# Patient Record
Sex: FEMALE | Race: OTHER | NOT HISPANIC OR LATINO | Employment: FULL TIME | URBAN - METROPOLITAN AREA
[De-identification: names, ages, dates, MRNs, and addresses within clinical notes are randomized per-mention and may not be internally consistent; named-entity substitution may affect disease eponyms.]

---

## 2024-08-08 DIAGNOSIS — M25.562 CHRONIC PAIN OF LEFT KNEE: ICD-10-CM

## 2024-08-08 DIAGNOSIS — G89.29 CHRONIC PAIN OF LEFT KNEE: ICD-10-CM

## 2024-08-08 RX ORDER — MELOXICAM 15 MG/1
15 TABLET ORAL DAILY
Qty: 30 TABLET | Refills: 1 | Status: SHIPPED | OUTPATIENT
Start: 2024-08-08

## 2024-08-11 DIAGNOSIS — L70.0 ACNE VULGARIS: ICD-10-CM

## 2024-08-12 RX ORDER — TRETINOIN 0.25 MG/G
CREAM TOPICAL NIGHTLY
Qty: 20 G | Refills: 4 | Status: SHIPPED | OUTPATIENT
Start: 2024-08-12

## 2024-09-08 DIAGNOSIS — A60.9 ANOGENITAL HERPES SIMPLEX VIRUS (HSV) INFECTION: ICD-10-CM

## 2024-09-08 RX ORDER — VALACYCLOVIR HYDROCHLORIDE 500 MG/1
500 TABLET, FILM COATED ORAL
Qty: 90 TABLET | Refills: 1 | Status: SHIPPED | OUTPATIENT
Start: 2024-09-08

## 2024-09-08 NOTE — TELEPHONE ENCOUNTER
No care due was identified.  [facility] Embedded Care Due Messages. Reference number: [id number].   9/08/2024 1:07:21 AM CDT

## 2024-09-08 NOTE — TELEPHONE ENCOUNTER
Refill Decision Note   Sherice Patrick  is requesting a refill authorization.  Brief Assessment and Rationale for Refill:  Approve     Medication Therapy Plan:        Comments:     Note composed:5:05 PM 09/08/2024

## 2024-09-18 ENCOUNTER — PATIENT MESSAGE (OUTPATIENT)
Dept: DERMATOLOGY | Facility: CLINIC | Age: 54
End: 2024-09-18
Payer: COMMERCIAL

## 2024-10-14 ENCOUNTER — PATIENT MESSAGE (OUTPATIENT)
Dept: FAMILY MEDICINE | Facility: CLINIC | Age: 54
End: 2024-10-14
Payer: COMMERCIAL

## 2024-10-14 ENCOUNTER — PATIENT MESSAGE (OUTPATIENT)
Dept: ORTHOPEDICS | Facility: CLINIC | Age: 54
End: 2024-10-14
Payer: COMMERCIAL

## 2024-10-14 DIAGNOSIS — M25.562 CHRONIC PAIN OF LEFT KNEE: ICD-10-CM

## 2024-10-14 DIAGNOSIS — G89.29 CHRONIC PAIN OF LEFT KNEE: ICD-10-CM

## 2024-10-15 RX ORDER — MELOXICAM 15 MG/1
15 TABLET ORAL DAILY
Qty: 30 TABLET | Refills: 1 | OUTPATIENT
Start: 2024-10-15

## 2024-10-16 ENCOUNTER — OFFICE VISIT (OUTPATIENT)
Dept: FAMILY MEDICINE | Facility: CLINIC | Age: 54
End: 2024-10-16
Payer: COMMERCIAL

## 2024-10-16 DIAGNOSIS — R03.0 ELEVATED BP WITHOUT DIAGNOSIS OF HYPERTENSION: ICD-10-CM

## 2024-10-16 DIAGNOSIS — G89.29 CHRONIC PAIN OF LEFT KNEE: Primary | ICD-10-CM

## 2024-10-16 DIAGNOSIS — Z79.1 LONG TERM (CURRENT) USE OF NON-STEROIDAL ANTI-INFLAMMATORIES (NSAID): ICD-10-CM

## 2024-10-16 DIAGNOSIS — M25.562 CHRONIC PAIN OF LEFT KNEE: Primary | ICD-10-CM

## 2024-10-16 RX ORDER — OMEPRAZOLE 20 MG/1
20 CAPSULE, DELAYED RELEASE ORAL DAILY
Qty: 90 CAPSULE | Refills: 3 | Status: SHIPPED | OUTPATIENT
Start: 2024-10-16 | End: 2025-10-16

## 2024-10-16 RX ORDER — MELOXICAM 15 MG/1
15 TABLET ORAL DAILY
Qty: 90 TABLET | Refills: 1 | Status: SHIPPED | OUTPATIENT
Start: 2024-10-16 | End: 2025-04-14

## 2024-10-16 NOTE — PROGRESS NOTES
The patient location is:  Patient Home [place]  The chief complaint leading to consultation is:   Chief Complaint   Patient presents with    Knee Pain     Of the left knee - med refill     Total time spent with patient: 10 min     Visit type: Virtual visit with synchronous audio and video  Each patient to whom he or she provides medical services by telemedicine is:  (1) informed of the relationship between the physician and patient and the respective role of any other health care provider with respect to management of the patient; and (2) notified that he or she may decline to receive medical services by telemedicine and may withdraw from such care at any time.    This service was not originating from a related E/M service provided within the previous 7 days nor will  to an E/M service or procedure within the next 24 hours or my soonest available appointment.  Prevailing standard of care was able to be met in this synchronous audio and video visit    Subjective:    Chief Complaint:   Chief Complaint   Patient presents with    Knee Pain     Of the left knee - med refill     274}  HPI:  54 y.o. female presents for telehealth evaluation.  She continues to experience left knee pain she has been seen by ortho with prescription for meloxicam however medication has not yet been renewed.  She does feel that meloxicam helps with improvement of pain and swelling of the left knee.    She has not yet had aspiration of the knee but has reported a good improvement of knee swelling and pain with steroid injection (performed December 2023).     The HPI and pertinent ROS is included in the Diagnostic Impression Remarks section at the end of the note. Please see below for further details.     The following portions of the patient's history were reviewed and updated as appropriate: allergies, current medications, past family history, past medical history, past social history, past surgical history and problem list.  Past  Medical History:   Diagnosis Date    Abnormal Pap smear of cervix     Anogenital herpesviral infection, unspecified     Daily suppressive therapy.  No recent outbreaks    Basal cell carcinoma     Colon polyp      Past Surgical History:   Procedure Laterality Date    AUGMENTATION OF BREAST Bilateral 2005    BREAST BIOPSY Right     Benign x3    CERVICAL BIOPSY  W/ LOOP ELECTRODE EXCISION      COLONOSCOPY      3 years ago in [place]    COLONOSCOPY N/A 2/19/2024    Procedure: COLONOSCOPY;  Surgeon: Gregorio Mckeon MD;  Location: [facility];  Service: Endoscopy;  Laterality: N/A;    CYSTOSCOPY N/A 09/18/2023    Procedure: CYSTOSCOPY;  Surgeon: Mary Kate Felipe MD;  [facility];  Service: Urology;  Laterality: N/A;    CYSTOSCOPY N/A 10/23/2023    Procedure: CYSTOSCOPY;  Surgeon: Mary Kate Felipe MD;  Location: [facility] OR;  Service: Urology;  Laterality: N/A;    ESOPHAGOGASTRODUODENOSCOPY N/A 2/5/2024    Procedure: EGD (ESOPHAGOGASTRODUODENOSCOPY);  Surgeon: Gregorio Mckeon MD;  Location: [facility];  Service: Endoscopy;  Laterality: N/A;    KNEE SURGERY Left     LUMBAR FUSION  2010    OVARIAN CYST REMOVAL      Laparoscopy    URETHRAL SLING  03/2022    TVT     Social History  Social History     Tobacco Use    Smoking status: Never     Passive exposure: Never    Smokeless tobacco: Never   Substance Use Topics    Alcohol use: Never    Drug use: Never     Family History   Problem Relation Name Age of Onset    Ovarian cancer Mother      Colon cancer Neg Hx      Crohn's disease Neg Hx      Ulcerative colitis Neg Hx       Review of patient's allergies indicates:  No Known Allergies  274}  Physical Examination  There were no vitals taken for this visit.  Wt Readings from Last 3 Encounters:   06/07/24 62.6 kg (138 lb 0.1 oz)   04/23/24 62.6 kg (138 lb 0.1 oz)   03/15/24 66.2 kg (145 lb 15.1 oz)     BP Readings from Last 3 Encounters:   04/23/24 (!) 146/64   02/19/24 (!) 100/59   02/05/24 123/76  "    Estimated body mass index is 20.38 kg/m² as calculated from the following:    Height as of 6/7/24: 5' 9" (1.753 m).    Weight as of 6/7/24: 62.6 kg (138 lb 0.1 oz).       CONSTITUTIONAL: No apparent distress. Does not appear acutely ill or septic. Appears adequately hydrated.  PULM: Breathing unlabored.  PSYCHIATRIC: Alert and conversant and grossly oriented. Mood is grossly neutral. Affect appropriate. Judgment and insight grossly intact.  Integument: normal coloration and turgor, no rashes, no suspicious skin lesions noted.    Data reviewed  Previous medical records reviewed and summarized in HPI.   274}    Laboratory  I have reviewed old labs below:  Lab Results   Component Value Date    WBC 7.16 01/29/2024    HGB 13.1 01/29/2024    HCT 40.0 01/29/2024    MCV 97 01/29/2024     01/29/2024     01/29/2024    K 4.0 01/29/2024     01/29/2024    CALCIUM 9.6 01/29/2024    CO2 27 01/29/2024    GLU 92 01/29/2024    BUN 14 01/29/2024    CREATININE 0.8 01/29/2024    ANIONGAP 7 (L) 01/29/2024    PROT 7.4 01/29/2024    ALBUMIN 3.8 01/29/2024    BILITOT 1.2 (H) 01/29/2024    ALKPHOS 47 (L) 01/29/2024    ALT 13 01/29/2024    AST 15 01/29/2024    CHOL 167 01/29/2024    TRIG 61 01/29/2024    HDL 70 01/29/2024    LDLCALC 84.8 01/29/2024    TSH 0.930 01/29/2024    HGBA1C 5.2 01/29/2024     Lab reviewed by me: Particular labs of significance that I will monitor, workup, or treat to improve are mentioned below in diagnostic impression remarks.  274}  Imaging/EKG: I have reviewed the pertinent results/findings and my personal findings are noted below in diagnostic impression remarks.     Assessment/Plan  Sherice Patrick is a 54 y.o. female who presents to clinic with:    1. Chronic pain of left knee    2. Long term (current) use of non-steroidal anti-inflammatories (nsaid)    3. Elevated BP without diagnosis of hypertension         Diagnostic Impression Remarks + HPI     Documentation entered by me for this " "encounter may have been done in part using speech-recognition technology. Although I have made an effort to ensure accuracy, "sound like" errors may exist and should be interpreted in context.           This is the extent of the patient's complaints at this present time. She denies chest pain upon exertion, dyspnea, nausea, vomiting, diaphoresis, and syncope. No pleuritic chest pain, unilateral leg swelling, calf tenderness, or calf pain.     Sherice will return to clinic in a few months for further workup and reassessment or sooner as needed. She was instructed to call the clinic or go to the emergency department if her symptoms do not improve, worsens, or if new symptoms develop. As we discussed that symptoms could worsen over the next 24 hours she was advised that if any increased swelling, pain, or numbness arise to go immediately to the ED. Patient knows to call any time if an emergency arises. Shared decision making occurred and she verbalized understanding in agreement with this plan.   274}  BMI Goal    Counseled patient on her ideal body weight, health consequences of being obese and current recommendations including weekly exercise and a heart healthy diet.  She is aware that ideal BMI < 25  Estimated body mass index is 20.38 kg/m² as calculated from the following:    Height as of 6/7/24: 5' 9" (1.753 m).    Weight as of 6/7/24: 62.6 kg (138 lb 0.1 oz).     She was counseled about the importance of healthy dietary habits as well as routine physical activity and exercise for better health outcomes. I also discussed the importance of cancer screening.     Medication Monitoring    In today's visit, monitoring for drug toxicity was accomplished. Proper use of medications was also discussed.     Counseling    Eat Less Unhealthy Fat   -Cut back on saturated fats and trans (also called hydrogenated) fats. A diet thats high in these fats increases your bad cholesterol. Its not enough to just cut back on foods " containing cholesterol.   -Eat about 2 servings of fish per week. Most fish contain omega-3 fatty acids. These help lower blood cholesterol.   -Eat more whole grains and soluble fiber (such as oat bran). These lower overall cholesterol.   -Counseled that most cholesterol is made in the liver and only a minority comes from diet.    Be Active   -Choose an activity you enjoy. Walking, swimming, and riding a bike are some good ways to be active.   -Start at a level where you feel comfortable. Increase your time and pace a little each week.   -Work up to 30 minutes on most days. You can break this up into three 10-minute periods.   -Remember, some activity is better than none.   -If you havent been exercising regularly, start slowly. Check with your doctor to make sure the exercise plan is right for you.     Take Medication As Directed: Many patients need medication to get their LDL levels to a safe level. Medication to lower cholesterol levels is effective and safe. (But taking medication is not a substitute for exercise or watching your diet!).    I discussed imaging findings, diagnosis, possibilities, treatment options, medications, risks, and benefits. She had many questions regarding the options and long-term effects. All questions were answered. She expressed understanding after counseling regarding the diagnosis and recommendations. She was capable and demonstrated competence with understanding of these options. Shared decision making was performed resulting in her choosing the current treatment plan.     I also discussed the importance of close follow up to discuss labs, change or modify her medications if needed, monitor side effects, and further evaluation of medical problems.     Additional workup planned: see labs ordered below.    See below for labs and meds ordered with associated diagnosis    Chronic pain of left knee  Comments:  Refilled meloxicam.  Per ortho.  Recommended follow up with ortho regarding  next steps if knee pain and swelling remains chronic  Orders:  -     meloxicam (MOBIC) 15 MG tablet; Take 1 tablet (15 mg total) by mouth once daily. For Knee Pain  Dispense: 90 tablet; Refill: 1    Long term (current) use of non-steroidal anti-inflammatories (nsaid)  Comments:  Start omeprazole therapy  Orders:  -     meloxicam (MOBIC) 15 MG tablet; Take 1 tablet (15 mg total) by mouth once daily. For Knee Pain  Dispense: 90 tablet; Refill: 1  -     omeprazole (PRILOSEC) 20 MG capsule; Take 1 capsule (20 mg total) by mouth once daily. Take in the AM before meals  Dispense: 90 capsule; Refill: 3    Elevated BP without diagnosis of hypertension  Comments:  Previously elevated BP at 146/64.  On NSAID therapy secondary to knee pain.  Recommended monitoring of BP and alternating meloxicam with Tylenol          Medication List with Changes/Refills   New Medications    OMEPRAZOLE (PRILOSEC) 20 MG CAPSULE    Take 1 capsule (20 mg total) by mouth once daily. Take in the AM before meals   Current Medications    ALBUTEROL (VENTOLIN HFA) 90 MCG/ACTUATION INHALER    Inhale 2 puffs into the lungs every 6 (six) hours as needed for Wheezing. Rescue    BIOTIN 100 MG/GRAM POWD    Take by mouth.    LACTOBACILLUS ACIDOPHILUS (PROBIOTIC) 10 BILLION CELL CAP    Take by mouth.    MULTIVITAMIN-IRON-FOLIC ACID TAB    Take by mouth.    TRETINOIN (RETIN-A) 0.025 % CREAM    Apply topically every evening.    VALACYCLOVIR (VALTREX) 500 MG TABLET    TAKE 1 TABLET BY MOUTH EVERY DAY   Changed and/or Refilled Medications    Modified Medication Previous Medication    MELOXICAM (MOBIC) 15 MG TABLET meloxicam (MOBIC) 15 MG tablet       Take 1 tablet (15 mg total) by mouth once daily. For Knee Pain    Take 1 tablet (15 mg total) by mouth once daily.   Discontinued Medications    PANTOPRAZOLE (PROTONIX) 40 MG TABLET    TAKE 1 TABLET BY MOUTH EVERY DAY     Modified Medications    Modified Medication Previous Medication    MELOXICAM (MOBIC) 15 MG TABLET  "meloxicam (MOBIC) 15 MG tablet       Take 1 tablet (15 mg total) by mouth once daily. For Knee Pain    Take 1 tablet (15 mg total) by mouth once daily.       Mayra Gardner DO  10/16/2024     Documentation entered by me for this encounter may have been done in part using speech-recognition technology. Although I have made an effort to ensure accuracy, "sound like" errors may exist and should be interpreted in context.  "

## 2024-11-06 ENCOUNTER — PATIENT MESSAGE (OUTPATIENT)
Dept: DERMATOLOGY | Facility: CLINIC | Age: 54
End: 2024-11-06

## 2024-11-06 ENCOUNTER — OFFICE VISIT (OUTPATIENT)
Dept: DERMATOLOGY | Facility: CLINIC | Age: 54
End: 2024-11-06
Payer: COMMERCIAL

## 2024-11-06 DIAGNOSIS — L57.8 PHOTOAGING OF SKIN: ICD-10-CM

## 2024-11-06 DIAGNOSIS — L72.9 CYST OF SKIN: Primary | ICD-10-CM

## 2024-11-06 DIAGNOSIS — I78.1 TELANGIECTASIAS: ICD-10-CM

## 2024-11-06 DIAGNOSIS — D48.5 NEOPLASM OF UNCERTAIN BEHAVIOR OF SKIN: ICD-10-CM

## 2024-11-06 PROCEDURE — 11103 TANGNTL BX SKIN EA SEP/ADDL: CPT | Mod: S$GLB,,, | Performed by: STUDENT IN AN ORGANIZED HEALTH CARE EDUCATION/TRAINING PROGRAM

## 2024-11-06 PROCEDURE — 11104 PUNCH BX SKIN SINGLE LESION: CPT | Mod: S$GLB,,, | Performed by: STUDENT IN AN ORGANIZED HEALTH CARE EDUCATION/TRAINING PROGRAM

## 2024-11-06 PROCEDURE — 88305 TISSUE EXAM BY PATHOLOGIST: CPT | Performed by: PATHOLOGY

## 2024-11-06 PROCEDURE — 88304 TISSUE EXAM BY PATHOLOGIST: CPT | Performed by: PATHOLOGY

## 2024-11-06 PROCEDURE — 99213 OFFICE O/P EST LOW 20 MIN: CPT | Mod: 25,S$GLB,, | Performed by: STUDENT IN AN ORGANIZED HEALTH CARE EDUCATION/TRAINING PROGRAM

## 2024-11-06 NOTE — PROGRESS NOTES
Subjective:      Patient ID:  Sherice Patrick is a 54 y.o. female who presents for   Chief Complaint   Patient presents with    Spot     Right upper cheek      LOV 2/29/24    Patient here today for spot on right upper cheek x approx 2 months. Will be red after washing face and bleeds at times.     Also complains of spot on back. Not bothersome.    Path from last visit: 2/29/24  Final Pathologic Diagnosis   1.  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  - MARGINS ARE NEGATIVE IN THE PLANES OF SECTION.    Derm hx:  BCC right temple-Mohs- 2023-in [place]  BCC  mid chest- 2020  No fhx of MM     Hx of tanning use.               Review of Systems   Constitutional:  Negative for fever, chills and fatigue.   Skin:  Positive for daily sunscreen use, activity-related sunscreen use and wears hat. Negative for itching, rash, dry skin and sun sensitivity.   Hematologic/Lymphatic: Does not bruise/bleed easily.       Objective:   Physical Exam   Constitutional: She appears well-developed and well-nourished.   Neurological: She is alert and oriented to person, place, and time.   Psychiatric: She has a normal mood and affect.   Skin:   Areas Examined (abnormalities noted in diagram):   Head / Face Inspection Performed  Back Inspection Performed                 Diagram Legend     Erythematous scaling macule/papule c/w actinic keratosis       Vascular papule c/w angioma      Pigmented verrucoid papule/plaque c/w seborrheic keratosis      Yellow umbilicated papule c/w sebaceous hyperplasia      Irregularly shaped tan macule c/w lentigo     1-2 mm smooth white papules consistent with Milia      Movable subcutaneous cyst with punctum c/w epidermal inclusion cyst      Subcutaneous movable cyst c/w pilar cyst      Firm pink to brown papule c/w dermatofibroma      Pedunculated fleshy papule(s) c/w skin tag(s)      Evenly  pigmented macule c/w junctional nevus     Mildly variegated pigmented, slightly irregular-bordered macule c/w mildly atypical nevus      Flesh colored to evenly pigmented papule c/w intradermal nevus       Pink pearly papule/plaque c/w basal cell carcinoma      Erythematous hyperkeratotic cursted plaque c/w SCC      Surgical scar with no sign of skin cancer recurrence      Open and closed comedones      Inflammatory papules and pustules      Verrucoid papule consistent consistent with wart     Erythematous eczematous patches and plaques     Dystrophic onycholytic nail with subungual debris c/w onychomycosis     Umbilicated papule    Erythematous-base heme-crusted tan verrucoid plaque consistent with inflamed seborrheic keratosis     Erythematous Silvery Scaling Plaque c/w Psoriasis     See annotation            Assessment / Plan:      Pathology Orders:       Normal Orders This Visit    Specimen to Pathology, Dermatology     Questions:    Procedure Type: Dermatology and skin neoplasms    Number of Specimens: 2    ------------------------: -------------------------    Spec 1 Procedure: Biopsy    Spec 1 Clinical Impression: r/o bcc    Spec 1 Source: right malar cheek    ------------------------: -------------------------    Spec 2 Procedure: Biopsy    Spec 2 Clinical Impression: cyst vs other    Spec 2 Source: right upper back    Release to patient:           Cyst of skin  -     Specimen to Pathology, Dermatology  Punch biopsy procedure note:  Punch biopsy performed after verbal consent obtained. Area marked and prepped with alcohol. Approximately 1cc of 1% lidocaine with epinephrine injected. 6 mm disposable punch used to remove lesion. Hemostasis obtained and biopsy site closed with 1 - 2 Prolene sutures. Wound care instructions reviewed with patient and handout given.    Neoplasm of uncertain behavior of skin  -     Specimen to Pathology, Dermatology  Shave biopsy procedure note:    Shave biopsy performed after  verbal consent including risk of infection, scar, recurrence, need for additional treatment of site. Area prepped with alcohol, anesthetized with approximately 1.0cc of 1% lidocaine with epinephrine. Lesional tissue shaved with razor blade. Hemostasis achieved with application of aluminum chloride followed by hyfrecation. No complications. Dressing applied. Wound care explained.    Telangiectasias   Photoaging of skin  Discussed compounded medications to help with vasoconstriction like oxymetazoline or brimonidine vs. Laser treatments for more permanent improvement  Refer to [facility]         No follow-ups on file.

## 2024-11-08 LAB
FINAL PATHOLOGIC DIAGNOSIS: NORMAL
GROSS: NORMAL
Lab: NORMAL
MICROSCOPIC EXAM: NORMAL

## 2024-11-11 ENCOUNTER — TELEPHONE (OUTPATIENT)
Dept: DERMATOLOGY | Facility: CLINIC | Age: 54
End: 2024-11-11
Payer: COMMERCIAL

## 2024-11-11 DIAGNOSIS — C44.319 BASAL CELL CARCINOMA (BCC) OF RIGHT CHEEK: Primary | ICD-10-CM

## 2024-11-11 NOTE — TELEPHONE ENCOUNTER
Message forward to correct clinic    ----- Message from [place]a sent at 11/11/2024  4:23 PM CST -----  Regarding: call back  Contact: pt  Type: Needs Medical Advice  Who Called:  patient  Symptoms (please be specific):    How long has patient had these symptoms:    Pharmacy name and phone #:    Best Call Back Number: [phone]    Additional Information: loco farnsworth pt is returning ur call are u available to assist MRN: [id number] JAY FOOTE

## 2024-11-14 ENCOUNTER — PROCEDURE VISIT (OUTPATIENT)
Dept: DERMATOLOGY | Facility: CLINIC | Age: 54
End: 2024-11-14
Payer: COMMERCIAL

## 2024-11-14 VITALS
DIASTOLIC BLOOD PRESSURE: 75 MMHG | HEART RATE: 51 BPM | HEIGHT: 69 IN | BODY MASS INDEX: 20.44 KG/M2 | SYSTOLIC BLOOD PRESSURE: 122 MMHG | WEIGHT: 138 LBS

## 2024-11-14 DIAGNOSIS — C44.319 BASAL CELL CARCINOMA (BCC) OF RIGHT CHEEK: ICD-10-CM

## 2024-11-14 PROCEDURE — 13132 CMPLX RPR F/C/C/M/N/AX/G/H/F: CPT | Mod: S$GLB,,, | Performed by: DERMATOLOGY

## 2024-11-14 PROCEDURE — 17311 MOHS 1 STAGE H/N/HF/G: CPT | Mod: 51,S$GLB,, | Performed by: DERMATOLOGY

## 2024-11-14 NOTE — PROGRESS NOTES
MOHS MICROGRAPHIC SURGERY OPERATIVE NOTE  Name:  Sherice Patrick  Date: 2024  Patient : 1970  Attending Surgeon: Morro Arredondo MD  Assistants: Lizz Mcmillan - Surgical Technician  Anesthetic Agent: 1% lidocaine with 1:100,000 epinephrine  Clinical Diagnosis: basal cell carcinoma   Operation: Mohs Micrographic Surgery  Location: right malar cheek  Indications: Location in mask areas of face including central face, nose, eyelids, eyebrows, lips, chin, preauricular, temple, and ear.  Surgical Preparation: povidone-iodine  Pre-op anbitioics: no     Description of Operation:  The nature and purpose of the procedure, associated risks and alternative treatments were explained to the patient in detail. All patient questions were answered completely. An informed operative consent and photography permit were obtained. The tumor location was then identified and marked with agreement by the patient of the correct location. The patient was positioned, prepped, and draped in the usual sterile manner. Local anesthesia was obtained with 2 cc(s) of 1% lidocaine with 1:100,000 epinephrine. The lesion pre-operatively measures 0.6 by 0.4 cm.     1ST STAGE:  A 2+ mm rim of normal appearing skin was marked circumferentially around the lesion after scraping with a curette to define the margin. The area thus outlined was excised at a 45 degree angle. Hemostasis was obtained with electrodesiccation. The specimen was oriented, mapped, and subdivided into at least two sections. Sections were then chromacoded and submitted for horizontal frozen sections. The patient tolerated the procedure well and there were no complications. Upon microscopic examination of the processed horizontal frozen sections of this stage:  No residual tumor was noted.      SUMMARY:  The tumor was extirpated in 1 Mohs Stages resulting in a final defect measuring 1.1 by 0.6 cm².   The final defect extended deep to subcutaneous fat  The patient  tolerated the procedure well and no complications were noted.     DEFECT PHOTO:      Morro Arerdondo MD  Complex Linear Closure Operative Note  Name: Sherice Patrick  YOB: 1970  Date: 11/14/2024  Attending Surgeon: Morro Arredondo MD FAAD  Assistants:  Lizz Mcmillan - Surgical Technician  Clinical Diagnosis: A 1.1 by 0.6 cm defect secondary to Mohs Micrographic Surgery  Location: right malar cheek  Operation: Complex linear closure  Surgical Preparation: broad scrub with povidone-iodine    Description of Operation:  The nature and purpose of the procedure, associated risks and alternative treatments were explained to the patient in detail. All patient questions were answered completely. In order to minimize tension on the closure and avoid compromising the anatomic contour of the cosmetic region and maximize functional capacity, a complex linear closure was performed. An informed operative consent and photography permit were obtained. The patient was positioned, prepped, and draped in the usual sterile manner. Local anesthesia was obtained with 3.0 cc(s) of 1% lidocaine with 1:100,000 epinephrine.    The defect edges were debeveled with a scalpel blade. The circular defect was widely undermined in the deep subcutaneous plane at least 100% of the defect diameter to allow for maximum tissue movement. Hemostasis was achieved with spot electrodessication. The defect was closed first utilizing multiple 5-0 Monocryl interrupted buried subcutaneous sutures. This resulted in excellent apposition of the dermis and epidermis, however two redundant areas of tissue were created on opposite sides of the defect. Utilizing a #15 scalpel blade, two Burows triangles were excised to ensure a flat scar. Hemostasis was obtained with spot electrodessication. The skin edges throughout further fastened superficially with 6-0 Prolene. The final length of the repair was 3.2 cm. The patient tolerated the procedure  well and there were no complications.    Polysporin, non-adherent gauze and a pressure dressing were applied to wound after gentle cleansing with saline.    Patient instructed in and provided with instructions for post-op care, will RTC in 7 days for suture removal    Post op meds: None    Photos:  Take photo @      MD MANUEL Glynn

## 2024-11-21 ENCOUNTER — CLINICAL SUPPORT (OUTPATIENT)
Dept: DERMATOLOGY | Facility: CLINIC | Age: 54
End: 2024-11-21
Payer: COMMERCIAL

## 2024-11-21 DIAGNOSIS — Z48.02 VISIT FOR SUTURE REMOVAL: Primary | ICD-10-CM

## 2024-11-21 PROCEDURE — 99999 PR PBB SHADOW E&M-EST. PATIENT-LVL I: CPT | Mod: PBBFAC,,,

## 2024-11-21 NOTE — PROGRESS NOTES
Mohs Surgery Suture Removal Note   Patient Name: Sherice Patrick  Patient : 1970  Date of Service: 2024    CC: Pt. Presents for removal of sutures on the right cheek today  Subjective: patient reports wound healing as expected     Objective: Wound well healed, sutures clean/dry/intact. No evidence of any complications noted.     Visit For Suture Removal:  - Suture removal today  - Steri strips/mastisol were not applied  - Patient counseled on silicone gel/silicone sheeting and their use in the management of post-operative scars  - Handout on silicone gel/silicone gel sheeting was provided  - Patient to follow up with their referring provider in 3 months for further skin evaluation        Skylar Shea

## 2024-12-10 DIAGNOSIS — L70.0 ACNE VULGARIS: ICD-10-CM

## 2024-12-11 RX ORDER — TRETINOIN 0.25 MG/G
CREAM TOPICAL NIGHTLY
Qty: 20 G | Refills: 4 | Status: SHIPPED | OUTPATIENT
Start: 2024-12-11

## 2024-12-18 ENCOUNTER — PATIENT MESSAGE (OUTPATIENT)
Dept: DERMATOLOGY | Facility: CLINIC | Age: 54
End: 2024-12-18
Payer: COMMERCIAL

## 2024-12-19 ENCOUNTER — PATIENT MESSAGE (OUTPATIENT)
Dept: DERMATOLOGY | Facility: CLINIC | Age: 54
End: 2024-12-19
Payer: COMMERCIAL

## 2025-01-07 ENCOUNTER — PATIENT MESSAGE (OUTPATIENT)
Dept: OBSTETRICS AND GYNECOLOGY | Facility: CLINIC | Age: 55
End: 2025-01-07
Payer: COMMERCIAL

## 2025-02-21 ENCOUNTER — OFFICE VISIT (OUTPATIENT)
Dept: OBSTETRICS AND GYNECOLOGY | Facility: CLINIC | Age: 55
End: 2025-02-21
Payer: COMMERCIAL

## 2025-02-21 VITALS
SYSTOLIC BLOOD PRESSURE: 118 MMHG | BODY MASS INDEX: 22.89 KG/M2 | DIASTOLIC BLOOD PRESSURE: 64 MMHG | HEIGHT: 69 IN | WEIGHT: 154.56 LBS

## 2025-02-21 DIAGNOSIS — Z12.4 SCREENING FOR MALIGNANT NEOPLASM OF CERVIX: ICD-10-CM

## 2025-02-21 DIAGNOSIS — H93.19 TINNITUS, UNSPECIFIED LATERALITY: ICD-10-CM

## 2025-02-21 DIAGNOSIS — Z12.31 ENCOUNTER FOR SCREENING MAMMOGRAM FOR MALIGNANT NEOPLASM OF BREAST: ICD-10-CM

## 2025-02-21 DIAGNOSIS — R14.0 ABDOMINAL BLOATING: ICD-10-CM

## 2025-02-21 DIAGNOSIS — Z01.419 WELL WOMAN EXAM WITH ROUTINE GYNECOLOGICAL EXAM: Primary | ICD-10-CM

## 2025-02-21 DIAGNOSIS — D25.9 UTERINE LEIOMYOMA, UNSPECIFIED LOCATION: ICD-10-CM

## 2025-02-21 PROCEDURE — 3008F BODY MASS INDEX DOCD: CPT | Mod: CPTII,S$GLB,, | Performed by: OBSTETRICS & GYNECOLOGY

## 2025-02-21 PROCEDURE — 87624 HPV HI-RISK TYP POOLED RSLT: CPT | Performed by: OBSTETRICS & GYNECOLOGY

## 2025-02-21 PROCEDURE — 99396 PREV VISIT EST AGE 40-64: CPT | Mod: S$GLB,,, | Performed by: OBSTETRICS & GYNECOLOGY

## 2025-02-21 PROCEDURE — 3078F DIAST BP <80 MM HG: CPT | Mod: CPTII,S$GLB,, | Performed by: OBSTETRICS & GYNECOLOGY

## 2025-02-21 PROCEDURE — 1159F MED LIST DOCD IN RCRD: CPT | Mod: CPTII,S$GLB,, | Performed by: OBSTETRICS & GYNECOLOGY

## 2025-02-21 PROCEDURE — 99999 PR PBB SHADOW E&M-EST. PATIENT-LVL III: CPT | Mod: PBBFAC,,, | Performed by: OBSTETRICS & GYNECOLOGY

## 2025-02-21 PROCEDURE — 3074F SYST BP LT 130 MM HG: CPT | Mod: CPTII,S$GLB,, | Performed by: OBSTETRICS & GYNECOLOGY

## 2025-02-21 NOTE — PROGRESS NOTES
[facility] Note    Pertinent Med & GYN Problem List    Know Uterine myoma    SUBJECTIVE     Chief Complaint   Patient presents with    Annual Exam       History and Physical:  Patient's last menstrual period was 2023.    HRT: None    Date: 2025    Sherice Patrick is a 55 y.o.  who presents today for her routine annual GYN exam.     Gynecologic issues: The patient denies any pelvic pain but reports recent issues with abdominal fullness and bloating.  She does report increasing hot flashes as well as issues with sleeping.    From a nongynecologic standpoint the patient does report some recent issues with tinnitus.    Pap smear history:  Positive Hx Abnormal pap smear.  (LEEP).  Last Pap smear: 2023    Mammogram: Up-to-date 2024  Colon cancer screening:  Up-to-date 2024 q 5 years    Family history:  Breast cancer:  Negative  Colon cancer:  Negative  Gyn related cancer:  POSITIVE - Mother @ age 75    The patient reports no changes in her medical or surgical history since her last evaluation.     Allergies: Review of patient's allergies indicates:  No Known Allergies    Past Medical History:   Diagnosis Date    Abnormal Pap smear of cervix     Anogenital herpesviral infection, unspecified     Daily suppressive therapy.  No recent outbreaks    Basal cell carcinoma     Colon polyp        Past Surgical History:   Procedure Laterality Date    AUGMENTATION OF BREAST Bilateral     BREAST BIOPSY Right     Benign x3    CERVICAL BIOPSY  W/ LOOP ELECTRODE EXCISION      COLONOSCOPY      3 years ago in [place]    COLONOSCOPY N/A 2024    Procedure: COLONOSCOPY;  Surgeon: Gregorio Mckeon MD;  Location: [facility] ENDO;  Service: Endoscopy;  Laterality: N/A;    CYSTOSCOPY N/A 2023    Procedure: CYSTOSCOPY;  Surgeon: Mary Kate Felipe MD;  Location: [facility] OR;  Service: Urology;  Laterality: N/A;    CYSTOSCOPY N/A 10/23/2023    Procedure: CYSTOSCOPY;  Surgeon:  "Mary Kate Felipe MD;  Location: [facility] OR;  Service: Urology;  Laterality: N/A;    ESOPHAGOGASTRODUODENOSCOPY N/A 2024    Procedure: EGD (ESOPHAGOGASTRODUODENOSCOPY);  Surgeon: Gregorio Mckeon MD;  Location: [facility];  Service: Endoscopy;  Laterality: N/A;    KNEE SURGERY Left     LUMBAR FUSION      OVARIAN CYST REMOVAL      Laparoscopy    URETHRAL SLING  2022    TVT       MEDS: Current Medications[1]     OB History          2    Para   2    Term   2            AB        Living   2         SAB        IAB        Ectopic        Multiple        Live Births   2           Obstetric Comments   Vaginal delivery x 2               Age of Menarche:12  Age at first pregnancy:30   Age at first live birth:31  Number of months breastfeeding:    Age at Menopause:    Comments:       Social History[2]    Family History   Problem Relation Name Age of Onset    Ovarian cancer Mother      Colon cancer Neg Hx      Crohn's disease Neg Hx      Ulcerative colitis Neg Hx         Past medical and surgical history reviewed.   I have reviewed the patient's medical history in detail and updated the computerized patient record.    Review of Systems (at today's evaluation)  Review of Systems   Constitutional:  Negative for fever and unexpected weight change.   HENT:  Positive for tinnitus.    Respiratory:  Negative for cough and shortness of breath.    Cardiovascular:  Negative for chest pain.   Gastrointestinal:  Positive for abdominal distention (bloating). Negative for constipation, diarrhea and nausea.   Genitourinary:  Negative for dysuria and frequency.          GYN AS PER HPI   Musculoskeletal: Negative.    Skin: Negative.    Neurological: Negative.    Psychiatric/Behavioral:  Positive for sleep disturbance.         Physical Exam:   /64 (BP Location: Left arm, Patient Position: Sitting)   Ht 5' 9" (1.753 m)   Wt 70.1 kg (154 lb 8.7 oz)   LMP 2023   BMI 22.82 kg/m²     Physical Exam: "   Constitutional: She appears well-developed and well-nourished.    HENT:   Head: Normocephalic.     Neck: No thyroid mass present.    Cardiovascular:  Normal rate.             Pulmonary/Chest: Effort normal. Right breast exhibits augmentation. Right breast exhibits no mass, no nipple discharge and no skin change. Left breast exhibits augmentation. Left breast exhibits no mass, no nipple discharge and no skin change.        Abdominal: Soft. There is no abdominal tenderness.     Genitourinary:    Inguinal canal, vagina, right adnexa and left adnexa normal.      Pelvic exam was performed with patient supine.   The external female genitalia was normal.   No external genitalia lesions identified,Cervix is normal. Right adnexum displays no mass and no tenderness. Left adnexum displays no mass and no tenderness. No tenderness or bleeding in the vagina. Uterus is enlarged (14 weeks in size) and hosting fibroids. Uterus is not tender. Normal urethral meatus.Urethra findings: no tendernessBladder findings: no bladder tenderness   Genitourinary Comments: Chaperone (female medical assistant) present throughout physical exam.             Musculoskeletal:      Right lower leg: No edema.      Left lower leg: No edema.      Lymphadenopathy:     She has no cervical adenopathy. No inguinal adenopathy noted on the right or left side.    Neurological: She is alert.   No gross defects noted    Skin: Skin is warm and dry.    Psychiatric: Mood normal.        Assessment:        1. Well woman exam with routine gynecological exam    2. Uterine leiomyoma, unspecified location    3. Tinnitus, unspecified laterality    4. Screening for malignant neoplasm of cervix    5. Abdominal bloating    6. Encounter for screening mammogram for malignant neoplasm of breast         Plan:      Well woman exam with routine gynecological exam    Uterine leiomyoma, unspecified location  -     US Pelvis Comp with Transvag NON-OB (xpd; Future; Expected date:  02/21/2025    Tinnitus, unspecified laterality  -     Ambulatory referral/consult to ENT; Future; Expected date: 02/28/2025    Screening for malignant neoplasm of cervix  -     HPV High Risk Genotypes, PCR  -     Liquid-Based Pap Smear, Screening    Abdominal bloating  -     US Pelvis Comp with Transvag NON-OB (xpd; Future; Expected date: 02/21/2025    Encounter for screening mammogram for malignant neoplasm of breast  -     Mammo Digital Screening Bilat w/ Kg (XPD); Future; Expected date: 02/21/2025       Follow up for ASAP after recommended testing obtained, F/U on today's evaluation.     The above was reviewed and discussed with the patient.    Annual exam and screening issues based on the patient's age, medical history and family history were reviewed / discussed.  Routine health maintenance issues were reviewed and discussed.      Parts of the patient's abdominal bloating and family history of ovarian cancer with known myomatous uterus a pelvic ultrasound has been ordered to further evaluate the uterus.  Potential treatment options were reviewed and discussed.    ENT referral placed in regards to the patient's recent tinnitus.    We will base further evaluation treatment results of the above testing.    The patient's questions were answered, and she is in agreement with the current plan.     Casper Whitley MD  Department OBGYN [facility]         [1]   Current Outpatient Medications:     biotin 100 mg/gram Powd, Take by mouth., Disp: , Rfl:     Lactobacillus acidophilus (PROBIOTIC) 10 billion cell Cap, Take by mouth., Disp: , Rfl:     multivitamin-iron-folic acid Tab, Take by mouth., Disp: , Rfl:     tretinoin (RETIN-A) 0.025 % cream, Apply topically every evening., Disp: 20 g, Rfl: 4    valACYclovir (VALTREX) 500 MG tablet, TAKE 1 TABLET BY MOUTH EVERY DAY, Disp: 90 tablet, Rfl: 1    albuterol (VENTOLIN HFA) 90 mcg/actuation inhaler, Inhale 2 puffs into the lungs every 6 (six) hours as needed for  Wheezing. Rescue, Disp: 18 g, Rfl: 0    meloxicam (MOBIC) 15 MG tablet, Take 1 tablet (15 mg total) by mouth once daily. For Knee Pain, Disp: 90 tablet, Rfl: 1    omeprazole (PRILOSEC) 20 MG capsule, Take 1 capsule (20 mg total) by mouth once daily. Take in the AM before meals, Disp: 90 capsule, Rfl: 3  [2]   Social History  Tobacco Use    Smoking status: Never     Passive exposure: Never    Smokeless tobacco: Never   Substance Use Topics    Alcohol use: Never    Drug use: Never

## 2025-02-26 ENCOUNTER — HOSPITAL ENCOUNTER (OUTPATIENT)
Dept: RADIOLOGY | Facility: HOSPITAL | Age: 55
Discharge: HOME | End: 2025-02-26
Attending: OBSTETRICS & GYNECOLOGY
Payer: COMMERCIAL

## 2025-02-26 DIAGNOSIS — D25.9 UTERINE LEIOMYOMA, UNSPECIFIED LOCATION: ICD-10-CM

## 2025-02-26 DIAGNOSIS — R14.0 ABDOMINAL BLOATING: ICD-10-CM

## 2025-02-26 PROCEDURE — 76830 TRANSVAGINAL US NON-OB: CPT | Mod: 26,,, | Performed by: RADIOLOGY

## 2025-02-26 PROCEDURE — 76830 TRANSVAGINAL US NON-OB: CPT | Mod: TC,PO

## 2025-02-26 PROCEDURE — 76856 US EXAM PELVIC COMPLETE: CPT | Mod: 26,,, | Performed by: RADIOLOGY

## 2025-02-27 ENCOUNTER — RESULTS FOLLOW-UP (OUTPATIENT)
Dept: OBSTETRICS AND GYNECOLOGY | Facility: CLINIC | Age: 55
End: 2025-02-27

## 2025-03-07 DIAGNOSIS — A60.9 ANOGENITAL HERPES SIMPLEX VIRUS (HSV) INFECTION: ICD-10-CM

## 2025-03-07 NOTE — TELEPHONE ENCOUNTER
Refill Routing Note   Medication(s) are not appropriate for processing by [facility] for the following reason(s):        Required labs outdated    [facility] action(s):  Defer     Requires labs : Yes             Appointments  past 12m or future 3m with PCP    Date Provider   Last Visit   10/16/2024 Laura Gardner, DO   Next Visit   Visit date not found Laura Gardner, DO   ED visits in past 90 days: 0        Note composed:6:26 AM 03/07/2025

## 2025-03-07 NOTE — TELEPHONE ENCOUNTER
Care Due:                  Date            Visit Type   Department     Provider  --------------------------------------------------------------------------------                                ESTABLISHED                              PATIENT -    [facility]  Last Visit: 10-      [facility]      [facility]       Laura Gardner  Next Visit: None Scheduled  None         None Found                                                            Last  Test          Frequency    Reason                     Performed    Due Date  --------------------------------------------------------------------------------    CBC.........  12 months..  valACYclovir.............  01- 01-    Cr..........  12 months..  valACYclovir.............  01- 01-    [facility] Embedded Care Due Messages. Reference number: [id number].   3/07/2025 12:11:09 AM CST

## 2025-03-10 ENCOUNTER — OFFICE VISIT (OUTPATIENT)
Dept: OBSTETRICS AND GYNECOLOGY | Facility: CLINIC | Age: 55
End: 2025-03-10
Payer: COMMERCIAL

## 2025-03-10 VITALS
DIASTOLIC BLOOD PRESSURE: 84 MMHG | SYSTOLIC BLOOD PRESSURE: 120 MMHG | BODY MASS INDEX: 22.95 KG/M2 | WEIGHT: 155.44 LBS

## 2025-03-10 DIAGNOSIS — R10.2 PELVIC PAIN: ICD-10-CM

## 2025-03-10 DIAGNOSIS — D25.9 UTERINE LEIOMYOMA, UNSPECIFIED LOCATION: Primary | ICD-10-CM

## 2025-03-10 DIAGNOSIS — R14.0 ABDOMINAL BLOATING: ICD-10-CM

## 2025-03-10 PROCEDURE — 3008F BODY MASS INDEX DOCD: CPT | Mod: CPTII,S$GLB,, | Performed by: OBSTETRICS & GYNECOLOGY

## 2025-03-10 PROCEDURE — 3079F DIAST BP 80-89 MM HG: CPT | Mod: CPTII,S$GLB,, | Performed by: OBSTETRICS & GYNECOLOGY

## 2025-03-10 PROCEDURE — 99999 PR PBB SHADOW E&M-EST. PATIENT-LVL III: CPT | Mod: PBBFAC,,, | Performed by: OBSTETRICS & GYNECOLOGY

## 2025-03-10 PROCEDURE — 3074F SYST BP LT 130 MM HG: CPT | Mod: CPTII,S$GLB,, | Performed by: OBSTETRICS & GYNECOLOGY

## 2025-03-10 PROCEDURE — 99213 OFFICE O/P EST LOW 20 MIN: CPT | Mod: S$GLB,,, | Performed by: OBSTETRICS & GYNECOLOGY

## 2025-03-10 RX ORDER — VALACYCLOVIR HYDROCHLORIDE 500 MG/1
500 TABLET, FILM COATED ORAL
Qty: 90 TABLET | Refills: 0 | Status: SHIPPED | OUTPATIENT
Start: 2025-03-10

## 2025-03-10 NOTE — PROGRESS NOTES
[facility] Note    Pertinent Med & GYN Problem List    Known Uterine myoma    SUBJECTIVE     Chief Complaint   Patient presents with    Follow-up     U/S follow up   ()       History and Physical:  Patient's last menstrual period was 2023.    HRT: None    Date: 2025    Sherice Patrick is a 55 y.o.  who presents today for her routine annual GYN exam.     Gynecologic issues: The patient denies any pelvic pain but reports recent issues with abdominal fullness and bloating.  She does report increasing hot flashes as well as issues with sleeping.    From a nongynecologic standpoint the patient does report some recent issues with tinnitus.    Pap smear history:  Positive Hx Abnormal pap smear.  (LEEP).  Last Pap smear: 2023    Mammogram: Up-to-date 2024  Colon cancer screening:  Up-to-date 2024 q 5 years    Family history:  Breast cancer:  Negative  Colon cancer:  Negative  Gyn related cancer:  POSITIVE - Mother @ age 75    The patient reports no changes in her medical or surgical history since her last evaluation.     Date: 3/10/2025    The patient and her  present today for follow-up.  She was last seen as above.  In light of her pelvic pain issues radiology testing was ordered.    She presents to discuss the result radiologic evaluation as well as further potential evaluation and treatment options.    Allergies: Review of patient's allergies indicates:  No Known Allergies    Past Medical History:   Diagnosis Date    Abnormal Pap smear of cervix     Anogenital herpesviral infection, unspecified     Daily suppressive therapy.  No recent outbreaks    Basal cell carcinoma     Colon polyp        Past Surgical History:   Procedure Laterality Date    AUGMENTATION OF BREAST Bilateral     BREAST BIOPSY Right     Benign x3    CERVICAL BIOPSY  W/ LOOP ELECTRODE EXCISION      COLONOSCOPY      3 years ago in [place]    COLONOSCOPY N/A 2024    Procedure:  COLONOSCOPY;  Surgeon: Gregorio Mckeon MD;  Location: [facility] ENDO;  Service: Endoscopy;  Laterality: N/A;    CYSTOSCOPY N/A 2023    Procedure: CYSTOSCOPY;  Surgeon: Mary Kate Felipe MD;  Location: [facility] OR;  Service: Urology;  Laterality: N/A;    CYSTOSCOPY N/A 10/23/2023    Procedure: CYSTOSCOPY;  Surgeon: Mary Kate Felipe MD;  [facility];  Service: Urology;  Laterality: N/A;    ESOPHAGOGASTRODUODENOSCOPY N/A 2024    Procedure: EGD (ESOPHAGOGASTRODUODENOSCOPY);  Surgeon: Gregorio Mckeon MD;  Location: [facility] ENDO;  Service: Endoscopy;  Laterality: N/A;    KNEE SURGERY Left     LUMBAR FUSION      OVARIAN CYST REMOVAL      Laparoscopy    URETHRAL SLING  2022    TVT       MEDS: Current Medications[1]     OB History          2    Para   2    Term   2            AB        Living   2         SAB        IAB        Ectopic        Multiple        Live Births   2           Obstetric Comments   Vaginal delivery x 2               Age of Menarche:12  Age at first pregnancy:30   Age at first live birth:31  Number of months breastfeeding:    Age at Menopause:    Comments:       Social History[2]    Family History   Problem Relation Name Age of Onset    Ovarian cancer Mother      Colon cancer Neg Hx      Crohn's disease Neg Hx      Ulcerative colitis Neg Hx         Past medical and surgical history reviewed.   I have reviewed the patient's medical history in detail and updated the computerized patient record.    Review of Systems (at today's evaluation)  Review of Systems   Constitutional:  Negative for fever and unexpected weight change.   HENT:  Positive for tinnitus.    Respiratory:  Negative for cough and shortness of breath.    Cardiovascular:  Negative for chest pain.   Gastrointestinal:  Positive for abdominal distention (bloating). Negative for constipation, diarrhea and nausea.   Genitourinary:  Negative for dysuria and frequency.          GYN AS PER HPI    Musculoskeletal: Negative.    Skin: Negative.    Neurological: Negative.    Psychiatric/Behavioral:  Positive for sleep disturbance.         Physical Exam:   /84 (BP Location: Left arm, Patient Position: Sitting)   Wt 70.5 kg (155 lb 6.8 oz)   LMP 08/02/2023   BMI 22.95 kg/m²   Prior exam  Physical Exam:   Constitutional: She appears well-developed and well-nourished.    HENT:   Head: Normocephalic.     Neck: No thyroid mass present.    Cardiovascular:  Normal rate.             Pulmonary/Chest: Effort normal. Right breast exhibits augmentation. Right breast exhibits no mass, no nipple discharge and no skin change. Left breast exhibits augmentation. Left breast exhibits no mass, no nipple discharge and no skin change.        Abdominal: Soft. There is no abdominal tenderness.     Genitourinary:    Inguinal canal, vagina, right adnexa and left adnexa normal.      Pelvic exam was performed with patient supine.   The external female genitalia was normal.   No external genitalia lesions identified,Cervix is normal. Right adnexum displays no mass and no tenderness. Left adnexum displays no mass and no tenderness. No tenderness or bleeding in the vagina. Uterus is enlarged (14 weeks in size) and hosting fibroids. Uterus is not tender. Normal urethral meatus.Urethra findings: no tendernessBladder findings: no bladder tenderness   Genitourinary Comments: Chaperone (female medical assistant) present throughout physical exam.             Musculoskeletal:      Right lower leg: No edema.      Left lower leg: No edema.      Lymphadenopathy:     She has no cervical adenopathy. No inguinal adenopathy noted on the right or left side.    Neurological: She is alert.   No gross defects noted    Skin: Skin is warm and dry.    Psychiatric: Mood normal.      Recent Laboratory Results:    Pap smear from 2/21/2025 noted no cervical cell abnormalities and was HPV negative.      Recent Radiology Results:     2/26/2025 Routine      Narrative & Impression  EXAMINATION:  US PELVIS COMP WITH TRANSVAG NON-OB (XPD)     CLINICAL HISTORY:  Leiomyoma of uterus, unspecified    FINDINGS:  Uterus:     Size: 12.4 x 7.6 cm     Masses: In the uterine fundus is a 7.6 cm heterogeneous fibroid and in the left myometrium is a 3 cm fibroid.     Endometrium: Normal in this post menopausal patient, measuring 6.7 mm.     Right ovary:     Not seen.    Left ovary:     Size: 2.8 x 2 cm     Appearance: There is a 1.4 cm hypoechoic probably bloody cyst.  Follow-up however is recommended.     Vascular Flow: Normal.     Free Fluid:     None.     Impression:     Two uterine fibroids the largest measuring 7.6 cm.  The 2nd fibroid is 3 cm.  A 1.4 cm hypoechoic probable bloody cyst of the left ovary is seen and follow-up is recommended.  The right ovary is not identified today    Assessment:        1. Uterine leiomyoma, unspecified location    2. Abdominal bloating    3. Pelvic pain           Plan:      Uterine leiomyoma, unspecified location    Abdominal bloating    Pelvic pain      Follow up Once further treatment decisions has been made, for as needed / for any GYN related issues.     The above was reviewed and discussed with the patien and her .    Conservative, medical (limited) and surgical intervention were reviewed and discussed.  The patient and her 's questions regarding the above were answered.  They would like to consider the options and will follow up once a further treatment decision has been made.    Casper Whitley MD  Department OBGYN [facility]           [1]   Current Outpatient Medications:     biotin 100 mg/gram Powd, Take by mouth., Disp: , Rfl:     Lactobacillus acidophilus (PROBIOTIC) 10 billion cell Cap, Take by mouth., Disp: , Rfl:     multivitamin-iron-folic acid Tab, Take by mouth., Disp: , Rfl:     tretinoin (RETIN-A) 0.025 % cream, Apply topically every evening., Disp: 20 g, Rfl: 4    valACYclovir (VALTREX) 500 MG tablet,  TAKE 1 TABLET BY MOUTH EVERY DAY, Disp: 90 tablet, Rfl: 0  [2]   Social History  Tobacco Use    Smoking status: Never     Passive exposure: Never    Smokeless tobacco: Never   Substance Use Topics    Alcohol use: Never    Drug use: Never

## 2025-03-17 ENCOUNTER — OFFICE VISIT (OUTPATIENT)
Dept: OBSTETRICS AND GYNECOLOGY | Facility: CLINIC | Age: 55
End: 2025-03-17
Payer: COMMERCIAL

## 2025-03-17 VITALS
SYSTOLIC BLOOD PRESSURE: 150 MMHG | DIASTOLIC BLOOD PRESSURE: 78 MMHG | HEIGHT: 69 IN | BODY MASS INDEX: 23.02 KG/M2 | WEIGHT: 155.44 LBS

## 2025-03-17 DIAGNOSIS — R10.2 PELVIC PAIN: ICD-10-CM

## 2025-03-17 DIAGNOSIS — D25.9 UTERINE LEIOMYOMA, UNSPECIFIED LOCATION: Primary | ICD-10-CM

## 2025-03-17 PROCEDURE — 99999 PR PBB SHADOW E&M-EST. PATIENT-LVL IV: CPT | Mod: PBBFAC,,, | Performed by: OBSTETRICS & GYNECOLOGY

## 2025-03-17 PROCEDURE — 99213 OFFICE O/P EST LOW 20 MIN: CPT | Mod: S$GLB,,, | Performed by: OBSTETRICS & GYNECOLOGY

## 2025-03-17 PROCEDURE — 3008F BODY MASS INDEX DOCD: CPT | Mod: CPTII,S$GLB,, | Performed by: OBSTETRICS & GYNECOLOGY

## 2025-03-17 PROCEDURE — 1159F MED LIST DOCD IN RCRD: CPT | Mod: CPTII,S$GLB,, | Performed by: OBSTETRICS & GYNECOLOGY

## 2025-03-17 PROCEDURE — 3078F DIAST BP <80 MM HG: CPT | Mod: CPTII,S$GLB,, | Performed by: OBSTETRICS & GYNECOLOGY

## 2025-03-17 PROCEDURE — 3077F SYST BP >= 140 MM HG: CPT | Mod: CPTII,S$GLB,, | Performed by: OBSTETRICS & GYNECOLOGY

## 2025-03-17 NOTE — PROGRESS NOTES
[facility] Note    Pertinent Med & GYN Problem List    Known Uterine myoma    SUBJECTIVE     Chief Complaint   Patient presents with    Follow-up       History and Physical:  Patient's last menstrual period was 2023.    HRT: None    Date: 2025    Sherice Patrick is a 55 y.o.  who presents today for her routine annual GYN exam.     Gynecologic issues: The patient denies any pelvic pain but reports recent issues with abdominal fullness and bloating.  She does report increasing hot flashes as well as issues with sleeping.    From a nongynecologic standpoint the patient does report some recent issues with tinnitus.    Pap smear history:  Positive Hx Abnormal pap smear.  (LEEP).  Last Pap smear: 2023    Mammogram: Up-to-date 2024  Colon cancer screening:  Up-to-date 2024 q 5 years    Family history:  Breast cancer:  Negative  Colon cancer:  Negative  Gyn related cancer:  POSITIVE - Mother @ age 75    The patient reports no changes in her medical or surgical history since her last evaluation.     Date: 3/10/2025    The patient and her  present today for follow-up.  She was last seen as above.  In light of her pelvic pain issues radiology testing was ordered.    She presents to discuss the result radiologic evaluation as well as further potential evaluation and treatment options.    Date: 3/17/2025    The patient presents today for follow-up.  She was last seen as above.  At that time we discussed the patient's persistent pelvic pain issues and potential treatment options for her pelvic pain and uterine myoma.  She presents today to further discuss these issues but is considering hysterectomy.    Allergies: Review of patient's allergies indicates:  No Known Allergies    Past Medical History:   Diagnosis Date    Abnormal Pap smear of cervix     Anogenital herpesviral infection, unspecified     Daily suppressive therapy.  No recent outbreaks    Basal cell carcinoma      Colon polyp        Past Surgical History:   Procedure Laterality Date    AUGMENTATION OF BREAST Bilateral 2005    BREAST BIOPSY Right     Benign x3    CERVICAL BIOPSY  W/ LOOP ELECTRODE EXCISION      COLONOSCOPY      3 years ago in [place]    COLONOSCOPY N/A 2024    Procedure: COLONOSCOPY;  Surgeon: Gregorio Mckeon MD;  Location: [facility];  Service: Endoscopy;  Laterality: N/A;    CYSTOSCOPY N/A 2023    Procedure: CYSTOSCOPY;  Surgeon: Mary Kate Felipe MD;  Location: [facility];  Service: Urology;  Laterality: N/A;    CYSTOSCOPY N/A 10/23/2023    Procedure: CYSTOSCOPY;  Surgeon: Mary Kate Felipe MD;  Location: [facility];  Service: Urology;  Laterality: N/A;    ESOPHAGOGASTRODUODENOSCOPY N/A 2024    Procedure: EGD (ESOPHAGOGASTRODUODENOSCOPY);  Surgeon: Gregorio Mckeon MD;  Location: [facility];  Service: Endoscopy;  Laterality: N/A;    KNEE SURGERY Left     LUMBAR FUSION      OVARIAN CYST REMOVAL      Laparoscopy    URETHRAL SLING  2022    TVT       MEDS: Current Medications[1]     OB History          2    Para   2    Term   2            AB        Living   2         SAB        IAB        Ectopic        Multiple        Live Births   2           Obstetric Comments   Vaginal delivery x 2               Age of Menarche:12  Age at first pregnancy:30   Age at first live birth:31  Number of months breastfeeding:    Age at Menopause:    Comments:       Social History[2]    Family History   Problem Relation Name Age of Onset    Ovarian cancer Mother      Colon cancer Neg Hx      Crohn's disease Neg Hx      Ulcerative colitis Neg Hx         Past medical and surgical history reviewed.   I have reviewed the patient's medical history in detail and updated the computerized patient record.    Review of Systems (at today's evaluation)  Review of Systems   Constitutional:  Negative for fever and unexpected weight change.   HENT:  Positive for tinnitus.    Respiratory:  " Negative for cough and shortness of breath.    Cardiovascular:  Negative for chest pain.   Gastrointestinal:  Positive for abdominal distention (bloating). Negative for constipation, diarrhea and nausea.   Genitourinary:  Negative for dysuria and frequency.          GYN AS PER HPI   Musculoskeletal: Negative.    Skin: Negative.    Neurological: Negative.    Psychiatric/Behavioral:  Positive for sleep disturbance.         Physical Exam:   BP (!) 150/78 (BP Location: Right arm, Patient Position: Sitting)   Ht 5' 9" (1.753 m)   Wt 70.5 kg (155 lb 6.8 oz)   LMP 08/02/2023   BMI 22.95 kg/m²   Prior exam  Physical Exam:   Constitutional: She appears well-developed and well-nourished.    HENT:   Head: Normocephalic.     Neck: No thyroid mass present.    Cardiovascular:  Normal rate.             Pulmonary/Chest: Effort normal. Right breast exhibits augmentation. Right breast exhibits no mass, no nipple discharge and no skin change. Left breast exhibits augmentation. Left breast exhibits no mass, no nipple discharge and no skin change.        Abdominal: Soft. There is no abdominal tenderness.     Genitourinary:    Inguinal canal, vagina, right adnexa and left adnexa normal.      Pelvic exam was performed with patient supine.   The external female genitalia was normal.   No external genitalia lesions identified,Cervix is normal. Right adnexum displays no mass and no tenderness. Left adnexum displays no mass and no tenderness. No tenderness or bleeding in the vagina. Uterus is enlarged (14 weeks in size) and hosting fibroids. Uterus is not tender. Normal urethral meatus.Urethra findings: no tendernessBladder findings: no bladder tenderness   Genitourinary Comments: Chaperone (female medical assistant) present throughout physical exam.             Musculoskeletal:      Right lower leg: No edema.      Left lower leg: No edema.      Lymphadenopathy:     She has no cervical adenopathy. No inguinal adenopathy noted on the " right or left side.    Neurological: She is alert.   No gross defects noted    Skin: Skin is warm and dry.    Psychiatric: Mood normal.      Recent Laboratory Results:    Pap smear from 2/21/2025 noted no cervical cell abnormalities and was HPV negative.      Recent Radiology Results:     2/26/2025 Routine     Narrative & Impression  EXAMINATION:  US PELVIS COMP WITH TRANSVAG NON-OB (XPD)     CLINICAL HISTORY:  Leiomyoma of uterus, unspecified    FINDINGS:  Uterus:     Size: 12.4 x 7.6 cm     Masses: In the uterine fundus is a 7.6 cm heterogeneous fibroid and in the left myometrium is a 3 cm fibroid.     Endometrium: Normal in this post menopausal patient, measuring 6.7 mm.     Right ovary:     Not seen.    Left ovary:     Size: 2.8 x 2 cm     Appearance: There is a 1.4 cm hypoechoic probably bloody cyst.  Follow-up however is recommended.     Vascular Flow: Normal.     Free Fluid:     None.     Impression:     Two uterine fibroids the largest measuring 7.6 cm.  The 2nd fibroid is 3 cm.  A 1.4 cm hypoechoic probable bloody cyst of the left ovary is seen and follow-up is recommended.  The right ovary is not identified today    Assessment:        1. Uterine leiomyoma, unspecified location    2. Pelvic pain      Plan:      Uterine leiomyoma, unspecified location    Pelvic pain      Follow up for As needed or 2 weeks following surgery.     The above was reviewed and discussed with the patient.    We once again discussed the results of her recent ultrasound and her abdominal pelvic issues..    Conservative, medical, and surgical interventions (including IR) were discussed, and the patient would like to proceed with surgical intervention.  She will be scheduled for a ROBOTIC ASSISTED TOTAL LAPAROSCOPIC HYSTERECTOMY, BILATERAL SALPINGECTOMY, POSSIBLE BILATERAL OOPHORECTOMY, CYTOSCOPY AND OTHER INDICATED PROCEDURES (The patient is considering removal of her ovaries at this time)    The pros, cons, risks, benefits,  alternatives, and indications of the surgical procedure were discussed in detail with the patient.  We discussed the possibility of allergic reactions, bleeding, infection damage to surrounding structures (cervix, uterus, bladder, ureters, bowel) and other abdominal pelvic organs.  Issues unique to this procedure were discussed.    The patient's questions regarding above were answered and she agrees with this plan.  The patient was provided with the informed consent form and given times reviewed the form.  Questions were answered and consent was obtained      Casper Whitley MD  Department OBGYN [facility]             [1]   Current Outpatient Medications:     biotin 100 mg/gram Powd, Take by mouth., Disp: , Rfl:     Lactobacillus acidophilus (PROBIOTIC) 10 billion cell Cap, Take by mouth., Disp: , Rfl:     multivitamin-iron-folic acid Tab, Take by mouth., Disp: , Rfl:     tretinoin (RETIN-A) 0.025 % cream, Apply topically every evening., Disp: 20 g, Rfl: 4    valACYclovir (VALTREX) 500 MG tablet, TAKE 1 TABLET BY MOUTH EVERY DAY, Disp: 90 tablet, Rfl: 0  [2]   Social History  Tobacco Use    Smoking status: Never     Passive exposure: Never    Smokeless tobacco: Never   Substance Use Topics    Alcohol use: Never    Drug use: Never

## 2025-03-22 RX ORDER — SODIUM CHLORIDE 9 MG/ML
INJECTION, SOLUTION INTRAVENOUS CONTINUOUS
OUTPATIENT
Start: 2025-03-22

## 2025-03-22 RX ORDER — CEFAZOLIN SODIUM 2 G/50ML
2 SOLUTION INTRAVENOUS
OUTPATIENT
Start: 2025-03-22

## 2025-03-22 RX ORDER — MUPIROCIN 20 MG/G
OINTMENT TOPICAL
OUTPATIENT
Start: 2025-03-22

## 2025-03-22 RX ORDER — PHENAZOPYRIDINE HYDROCHLORIDE 200 MG/1
200 TABLET, FILM COATED ORAL
OUTPATIENT
Start: 2025-03-22 | End: 2025-03-22

## 2025-03-24 ENCOUNTER — PATIENT MESSAGE (OUTPATIENT)
Dept: OBSTETRICS AND GYNECOLOGY | Facility: CLINIC | Age: 55
End: 2025-03-24
Payer: COMMERCIAL

## 2025-03-26 ENCOUNTER — OFFICE VISIT (OUTPATIENT)
Dept: DERMATOLOGY | Facility: CLINIC | Age: 55
End: 2025-03-26
Payer: COMMERCIAL

## 2025-03-26 DIAGNOSIS — L90.5 SCAR: ICD-10-CM

## 2025-03-26 DIAGNOSIS — L81.4 LENTIGO: ICD-10-CM

## 2025-03-26 DIAGNOSIS — Z85.828 HISTORY OF NONMELANOMA SKIN CANCER: Primary | ICD-10-CM

## 2025-03-26 DIAGNOSIS — L82.1 SEBORRHEIC KERATOSIS: ICD-10-CM

## 2025-03-26 DIAGNOSIS — D22.9 MULTIPLE BENIGN NEVI: ICD-10-CM

## 2025-03-26 DIAGNOSIS — L73.9 FOLLICULITIS: ICD-10-CM

## 2025-03-26 DIAGNOSIS — D48.5 NEOPLASM OF UNCERTAIN BEHAVIOR OF SKIN: ICD-10-CM

## 2025-03-26 PROCEDURE — 88305 TISSUE EXAM BY PATHOLOGIST: CPT | Mod: 26,,, | Performed by: PATHOLOGY

## 2025-03-26 PROCEDURE — 11102 TANGNTL BX SKIN SINGLE LES: CPT | Mod: S$GLB,,, | Performed by: STUDENT IN AN ORGANIZED HEALTH CARE EDUCATION/TRAINING PROGRAM

## 2025-03-26 PROCEDURE — 1159F MED LIST DOCD IN RCRD: CPT | Mod: CPTII,S$GLB,, | Performed by: STUDENT IN AN ORGANIZED HEALTH CARE EDUCATION/TRAINING PROGRAM

## 2025-03-26 PROCEDURE — 1160F RVW MEDS BY RX/DR IN RCRD: CPT | Mod: CPTII,S$GLB,, | Performed by: STUDENT IN AN ORGANIZED HEALTH CARE EDUCATION/TRAINING PROGRAM

## 2025-03-26 PROCEDURE — 88305 TISSUE EXAM BY PATHOLOGIST: CPT | Mod: TC | Performed by: STUDENT IN AN ORGANIZED HEALTH CARE EDUCATION/TRAINING PROGRAM

## 2025-03-26 PROCEDURE — 88342 IMHCHEM/IMCYTCHM 1ST ANTB: CPT | Mod: 26,,, | Performed by: PATHOLOGY

## 2025-03-26 PROCEDURE — 99213 OFFICE O/P EST LOW 20 MIN: CPT | Mod: 25,S$GLB,, | Performed by: STUDENT IN AN ORGANIZED HEALTH CARE EDUCATION/TRAINING PROGRAM

## 2025-03-26 NOTE — PATIENT INSTRUCTIONS
Shave Biopsy Wound Care    Your doctor has performed a shave biopsy today.  A band aid and vaseline ointment has been placed over the site.  This should remain in place for 24 hours.  It is recommended that you keep the area dry for the first 24 hours.  After 24 hours, you may remove the band aid and wash the area with warm soap and water and apply Vaseline jelly.  Many patients prefer to use Neosporin or Bacitracin ointment.  This is acceptable; however, know that you can develop an allergy to this medication even if you have used it safely for years.  It is important to keep the area moist.  Letting it dry out and get air slows healing time, and will worsen the scar.  Band aid is optional after first 24 hours.      If you notice increasing redness, tenderness, pain, or yellow drainage at the biopsy site, please notify your doctor.  These are signs of an infection.    If your biopsy site is bleeding, apply firm pressure for 15 minutes straight.  Repeat for another 15 minutes, if it is still bleeding.   If the surgical site continues to bleed, then please contact your doctor.      [facility] [place] [address]/ [phone] FAX/ [email or web address]       
COVID-19

## 2025-03-26 NOTE — PROGRESS NOTES
Subjective:      Patient ID:  Sherice Patrick is a 55 y.o. female who presents for   Chief Complaint   Patient presents with    Spot     Right bra line, back      LOV 11/6/24    Patient here today for skin check UBSE  Complains of spots on right bra line and back. Not bothersome.     Path from last visit:   Final Pathologic Diagnosis   1. Skin, right malar cheek, shave biopsy: --> mohs Dr. K 11/14/24  - BASAL CELL CARCINOMA.  - THE TUMOR EXTENDS TO THE DEEP AND LATERAL BIOPSY MARGINS.       Derm hx:  Moderately atypical nevus, left upper abdomen, monitor 2/29/24  BCC right temple-Mohs- 2023-in [place]  BCC  mid chest- 2020  No fhx of MM     Hx of tanning use.          Review of Systems   Constitutional:  Negative for fever, chills and fatigue.   Skin:  Positive for daily sunscreen use, activity-related sunscreen use and wears hat. Negative for itching, rash, dry skin and sun sensitivity.   Hematologic/Lymphatic: Does not bruise/bleed easily.       Objective:   Physical Exam   Constitutional: She appears well-developed and well-nourished. No distress.   Neurological: She is alert and oriented to person, place, and time. She is not disoriented.   Psychiatric: She has a normal mood and affect.   Skin:   Areas Examined (abnormalities noted in diagram):   Scalp / Hair Palpated and Inspected  Head / Face Inspection Performed  Neck Inspection Performed  Chest / Axilla Inspection Performed  Abdomen Inspection Performed  Back Inspection Performed  RUE Inspected  LUE Inspection Performed  Nails and Digits Inspection Performed                     Diagram Legend     Erythematous scaling macule/papule c/w actinic keratosis       Vascular papule c/w angioma      Pigmented verrucoid papule/plaque c/w seborrheic keratosis      Yellow umbilicated papule c/w sebaceous hyperplasia      Irregularly shaped tan macule c/w lentigo     1-2 mm smooth white papules consistent with Milia      Movable subcutaneous cyst with punctum c/w epidermal  inclusion cyst      Subcutaneous movable cyst c/w pilar cyst      Firm pink to brown papule c/w dermatofibroma      Pedunculated fleshy papule(s) c/w skin tag(s)      Evenly pigmented macule c/w junctional nevus     Mildly variegated pigmented, slightly irregular-bordered macule c/w mildly atypical nevus      Flesh colored to evenly pigmented papule c/w intradermal nevus       Pink pearly papule/plaque c/w basal cell carcinoma      Erythematous hyperkeratotic cursted plaque c/w SCC      Surgical scar with no sign of skin cancer recurrence      Open and closed comedones      Inflammatory papules and pustules      Verrucoid papule consistent consistent with wart     Erythematous eczematous patches and plaques     Dystrophic onycholytic nail with subungual debris c/w onychomycosis     Umbilicated papule    Erythematous-base heme-crusted tan verrucoid plaque consistent with inflamed seborrheic keratosis     Erythematous Silvery Scaling Plaque c/w Psoriasis     See annotation      Assessment / Plan:      Pathology Orders:       Normal Orders This Visit    Specimen to Pathology, Dermatology     Questions:    Procedure Type: Dermatology and skin neoplasms    Number of Specimens: 1    ------------------------: -------------------------    Spec 1 Procedure: Shave Biopsy    Spec 1 Clinical Impression: dysplastic nevus r/o MM    Spec 1 Source: left lower abdomen    Clinical Information: see EPIC    Clinical History: see EPIC    Specimen Source: Skin    Release to patient: Immediate    Send normal result to authorizing provider's In Basket if patient is active on MyChart: Yes          History of nonmelanoma skin cancer  Scar  Area(s) of previous NMSC evaluated with no signs of recurrence.  Upper body skin examination performed today including at least 9 points as noted in physical examination. Suspicious lesions noted.  Patient instructed in importance in daily broad spectrum sun protection of at least spf 30. Mineral sunscreen  ingredients preferred (Zinc +/- Titanium) and can be found OTC.   Patient encouraged to wear hat for all outdoor exposure.   Also discussed sun avoidance and use of protective clothing.    Neoplasm of uncertain behavior of skin  -     Specimen to Pathology, Dermatology  Shave biopsy procedure note:    Shave biopsy performed after verbal consent including risk of infection, scar, recurrence, need for additional treatment of site. Area prepped with alcohol, anesthetized with approximately 1.0cc of 1% lidocaine with epinephrine. Lesional tissue shaved with razor blade. Hemostasis achieved with application of aluminum chloride followed by hyfrecation. No complications. Dressing applied. Wound care explained.    Multiple benign nevi  Careful dermoscopy evaluation of nevi performed with biopsy above  Monitor for new mole or moles that are becoming bigger, darker, irritated, or developing irregular borders.     Seborrheic keratosis  These are benign inherited growths without a malignant potential. Reassurance given to patient. No treatment is necessary.     Lentigo  This is a benign hyperpigmented sun induced lesion. Daily sun protection will reduce the number of new lesions. Treatment of these benign lesions are considered cosmetic.    Folliculitis  Uses sal acid  Consider using BPO wash in the shower for back/neck         1 year or sooner pending biopsy  No follow-ups on file.

## 2025-04-01 ENCOUNTER — RESULTS FOLLOW-UP (OUTPATIENT)
Dept: DERMATOLOGY | Facility: CLINIC | Age: 55
End: 2025-04-01

## 2025-04-09 ENCOUNTER — PATIENT MESSAGE (OUTPATIENT)
Dept: OBSTETRICS AND GYNECOLOGY | Facility: CLINIC | Age: 55
End: 2025-04-09
Payer: COMMERCIAL

## 2025-04-25 ENCOUNTER — PATIENT MESSAGE (OUTPATIENT)
Dept: OBSTETRICS AND GYNECOLOGY | Facility: CLINIC | Age: 55
End: 2025-04-25
Payer: COMMERCIAL

## 2025-04-25 ENCOUNTER — CLINICAL SUPPORT (OUTPATIENT)
Dept: AUDIOLOGY | Facility: CLINIC | Age: 55
End: 2025-04-25
Payer: COMMERCIAL

## 2025-04-25 ENCOUNTER — OFFICE VISIT (OUTPATIENT)
Dept: OTOLARYNGOLOGY | Facility: CLINIC | Age: 55
End: 2025-04-25
Payer: COMMERCIAL

## 2025-04-25 VITALS — BODY MASS INDEX: 22.92 KG/M2 | WEIGHT: 154.75 LBS | HEIGHT: 69 IN

## 2025-04-25 DIAGNOSIS — H90.3 ASYMMETRIC SNHL (SENSORINEURAL HEARING LOSS): Primary | ICD-10-CM

## 2025-04-25 DIAGNOSIS — H93.19 TINNITUS, UNSPECIFIED LATERALITY: ICD-10-CM

## 2025-04-25 PROCEDURE — 99999 PR PBB SHADOW E&M-EST. PATIENT-LVL III: CPT | Mod: PBBFAC,,, | Performed by: OTOLARYNGOLOGY

## 2025-04-25 PROCEDURE — 99999 PR PBB SHADOW E&M-EST. PATIENT-LVL I: CPT | Mod: PBBFAC,,, | Performed by: AUDIOLOGIST

## 2025-04-25 NOTE — PROGRESS NOTES
Subjective:       Patient ID: Sherice Patrick is a 55 y.o. female.    Chief Complaint: Tinnitus (Patient is here with c/o of ringing in the ears ongoing for about two years since she moved here from [place]. She did have audiogram done today. )      This 55-year-old tells me she has a family history of hearing loss has tinnitus for at least a couple of years and was interested in just having things checked to see if in fact there was something that needed attention or additional thoughts     Her audiogram was performed prior to our visit it shows normal hearing in the low and mid frequencies and a moderate high-frequency loss slightly worse in the right ear than the left but nothing particularly fishy             Objective:      ENT Physical Exam    Her tympanic membranes and ear canals are normal that is no obvious abnormality        Assessment:       1. Tinnitus, unspecified laterality         Plan:          We discussed her mild high-frequency sensorineural loss perhaps a little premature for 55-year-old to have this degree but she does have a family history of hearing loss and I do think that is warrants follow up in maybe four or five years with a repeat audiogram or sooner if she notices a change of any consequence.

## 2025-04-25 NOTE — PROGRESS NOTES
Sherice Patrick was seen on 04/25/2025 for an audiological evaluation. Pt was alone during today's visit. Pertinent complaints today include tinnitus. Pt confirms history of loud noise exposure and confirms a family history of hearing loss. Otoscopy revealed no cerumen in both ears. The tympanic membrane was visualized AU prior to proceeding with the hearing testing.     Results reveal a mild-to-moderate sensorineural hearing loss for the right ear and  mild-to-moderate sensorineural hearing loss for the left ear.    Speech Reception Thresholds were  20 dBHL for the right ear and 20 dBHL for the left ear.    Word recognition scores were good for the right ear and good for the left ear.   Tympanograms were Type A for the right ear and Type A for the left ear.    Recommendations: 1) Follow up with ENT     2) Annual hearing evaluation     3) Wear hearing protection around noise    Audiogram results were reviewed in detail with patient and all questions were answered. Results will be reviewed by the referring provider at the completion of this note. All complaints were addressed during this visit to the patient's satisfaction.

## 2025-05-18 ENCOUNTER — PATIENT MESSAGE (OUTPATIENT)
Dept: OBSTETRICS AND GYNECOLOGY | Facility: CLINIC | Age: 55
End: 2025-05-18
Payer: COMMERCIAL

## 2025-05-19 NOTE — TELEPHONE ENCOUNTER
Please advise for request for pain medication as OTC medication isn't helping and the pain is causing nausea and she is unable to sleep. Please advise for request for pain medication.

## 2025-05-20 ENCOUNTER — TELEPHONE (OUTPATIENT)
Dept: OBSTETRICS AND GYNECOLOGY | Facility: CLINIC | Age: 55
End: 2025-05-20
Payer: COMMERCIAL

## 2025-05-20 DIAGNOSIS — D25.9 UTERINE LEIOMYOMA, UNSPECIFIED LOCATION: Primary | ICD-10-CM

## 2025-05-20 DIAGNOSIS — R10.2 PELVIC PAIN: ICD-10-CM

## 2025-05-20 RX ORDER — OXYCODONE AND ACETAMINOPHEN 5; 325 MG/1; MG/1
1 TABLET ORAL EVERY 4 HOURS PRN
Qty: 30 TABLET | Refills: 0 | Status: SHIPPED | OUTPATIENT
Start: 2025-05-20

## 2025-05-28 ENCOUNTER — HOSPITAL ENCOUNTER (OUTPATIENT)
Dept: PREADMISSION TESTING | Facility: HOSPITAL | Age: 55
Discharge: HOME | End: 2025-05-28
Attending: OBSTETRICS & GYNECOLOGY
Payer: COMMERCIAL

## 2025-05-28 VITALS — BODY MASS INDEX: 22.22 KG/M2 | HEIGHT: 69 IN | WEIGHT: 150 LBS

## 2025-05-28 DIAGNOSIS — Z01.818 PREOP TESTING: Primary | ICD-10-CM

## 2025-05-28 DIAGNOSIS — Z01.818 PREOP TESTING: ICD-10-CM

## 2025-05-28 LAB
HCT VFR BLD AUTO: 39.9 % (ref 37–48.5)
HGB BLD-MCNC: 12.9 GM/DL (ref 12–16)
OHS QRS DURATION: 88 MS
OHS QTC CALCULATION: 424 MS

## 2025-05-28 PROCEDURE — 93005 ELECTROCARDIOGRAM TRACING: CPT

## 2025-05-28 PROCEDURE — 93010 ELECTROCARDIOGRAM REPORT: CPT | Mod: ,,, | Performed by: INTERNAL MEDICINE

## 2025-05-28 PROCEDURE — 36415 COLL VENOUS BLD VENIPUNCTURE: CPT | Performed by: ANESTHESIOLOGY

## 2025-05-28 NOTE — DISCHARGE INSTRUCTIONS
To confirm, Your doctor has instructed you that surgery is scheduled for:  6-2-25    Please report to [facility], Registration the morning of surgery. You must check-in and receive a wristband before going to your procedure.  [address] [place] [address] [place] [id number]    Pre-Op will call the afternoon prior to surgery between 1:00 and 6:00 PM with the final arrival time.  Phone number: [phone]    PLEASE NOTE:  The surgery schedule has many variables which may affect the time of your surgery case.  Family members should be available if your surgery time changes.  Plan to be here the day of your procedure between 4-6 hours.    MEDICATIONS:  TAKE ONLY THESE MEDICATIONS WITH A SMALL SIP OF WATER THE MORNING OF YOUR PROCEDURE:       none      DO NOT TAKE THESE MEDICATIONS 5-7 DAYS PRIOR to your procedure or per your surgeon's request:   ASPIRIN, ALEVE, ADVIL, IBUPROFEN, FISH OIL VITAMIN E, HERBALS  (May take Tylenol)    ONLY if you are prescribed any types of blood thinners such as:  Aspirin, Coumadin, Plavix, Pradaxa, Xarelto, Aggrenox, Effient, Eliquis, Savasya, Brilinta, or any other, ask your surgeon whether you should stop taking them and how long before surgery you should stop.  You may also need to verify with the prescribing physician if it is ok to stop your medication.      INSTRUCTIONS IMPORTANT!!  Do not eat or drink anything between midnight and the time of your procedure- this includes gum, mints, and candy.  EXCEPT: you may have clear liquids such as:  WATER, BLACK COFFEE, UNSWEET TEA, OR GATORADE (NO RED OR PURPLE) UP TO 2 HOURS PRIOR TO YOUR ARRIVAL TIME.  Do not smoke or drink alcoholic beverages 24 hours prior to your procedure.  Shower the night before AND the morning of your procedure with a Chlorhexidine wash such as Hibiclens or Dial antibacterial soap from the neck down.  Do not get it on your face or in your eyes.  You may use your own shampoo and face wash. This helps your  skin to be as bacteria free as possible.    If you wear contact lenses, dentures, hearing aids or glasses, bring a container to put them in during surgery and give to a family member for safe keeping.  Please leave all jewelry, piercing's and valuables at home. You must remove your false eyelashes prior to surgery.    DO NOT remove hair from the surgery site.  Do not shave the incision site unless you are given specific instructions to do so.    ONLY if you have been diagnosed with sleep apnea please bring your C-PAP machine.  ONLY if you wear home oxygen please bring your portable oxygen tank the day of your procedure.  ONLY if you have a history of OPEN HEART SURGERY you will need a clearance from your Cardiologist per Anesthesia.      ONLY for patients requiring bowel prep, written instructions will be given by your doctor's office.  ONLY if you have any type of stimulator implant or any type of implanted device with a remote control.  Please bring the controller with you the morning of surgery  If your doctor has scheduled you for an overnight stay, bring a small overnight bag with any personal items you need.  Make arrangements in advance for transportation home by a responsible adult. You can not go home in an uber or a cab per hospital policy.  It is not safe to drive a vehicle during the 24 hours after anesthesia.          All  facilities and properties are tobacco free.  Smoking is NOT allowed.   If you have any questions about these instructions, call Pre-Op Admit  Nursing at [phone] or the Pre-Op Day Surgery Unit at [phone].

## 2025-05-30 ENCOUNTER — ANESTHESIA EVENT (OUTPATIENT)
Dept: SURGERY | Facility: HOSPITAL | Age: 55
End: 2025-05-30
Payer: COMMERCIAL

## 2025-06-01 NOTE — H&P
[facility] Note     Pertinent Med & GYN Problem List     Known Uterine myoma     SUBJECTIVE          Chief Complaint   Patient presents with    Follow-up         History and Physical:  Patient's last menstrual period was 2023.     HRT: None     Date: 2025     Sherice Patrick is a 55 y.o.  who presents today for her routine annual GYN exam.      Gynecologic issues: The patient denies any pelvic pain but reports recent issues with abdominal fullness and bloating.  She does report increasing hot flashes as well as issues with sleeping.     From a nongynecologic standpoint the patient does report some recent issues with tinnitus.     Pap smear history:  Positive Hx Abnormal pap smear.  (LEEP).  Last Pap smear: 2023     Mammogram: Up-to-date 2024  Colon cancer screening:  Up-to-date 2024 q 5 years     Family history:  Breast cancer:  Negative  Colon cancer:  Negative  Gyn related cancer:  POSITIVE - Mother @ age 75     The patient reports no changes in her medical or surgical history since her last evaluation.      Date: 3/10/2025     The patient and her  present today for follow-up.  She was last seen as above.  In light of her pelvic pain issues radiology testing was ordered.     She presents to discuss the result radiologic evaluation as well as further potential evaluation and treatment options.     Date: 3/17/2025     The patient presents today for follow-up.  She was last seen as above.  At that time we discussed the patient's persistent pelvic pain issues and potential treatment options for her pelvic pain and uterine myoma.  She presents today to further discuss these issues but is considering hysterectomy.     Allergies: Review of patient's allergies indicates:  No Known Allergies          Past Medical History:   Diagnosis Date    Abnormal Pap smear of cervix      Anogenital herpesviral infection, unspecified       Daily suppressive therapy.  No recent  outbreaks    Basal cell carcinoma      Colon polyp                 Past Surgical History:   Procedure Laterality Date    AUGMENTATION OF BREAST Bilateral 2005    BREAST BIOPSY Right       Benign x3    CERVICAL BIOPSY  W/ LOOP ELECTRODE EXCISION        COLONOSCOPY         3 years ago in [place]    COLONOSCOPY N/A 2024     Procedure: COLONOSCOPY;  Surgeon: Gregorio Mckeon MD;  Location: [facility] ENDO;  Service: Endoscopy;  Laterality: N/A;    CYSTOSCOPY N/A 2023     Procedure: CYSTOSCOPY;  Surgeon: Mary Kate Felipe MD;  Location: [facility] ASU OR;  Service: Urology;  Laterality: N/A;    CYSTOSCOPY N/A 10/23/2023     Procedure: CYSTOSCOPY;  Surgeon: Mary Kate Felipe MD;  Location: [facility] A[facility] OR;  Service: Urology;  Laterality: N/A;    ESOPHAGOGASTRODUODENOSCOPY N/A 2024     Procedure: EGD (ESOPHAGOGASTRODUODENOSCOPY);  Surgeon: Gregorio Mckeon MD;  Location: [facility] ENDO;  Service: Endoscopy;  Laterality: N/A;    KNEE SURGERY Left      LUMBAR FUSION       OVARIAN CYST REMOVAL         Laparoscopy    URETHRAL SLING   2022     TVT         MEDS: [Current Medications]     [Current Medications]     Current Outpatient Medications:     biotin 100 mg/gram Powd, Take by mouth., Disp: , Rfl:     Lactobacillus acidophilus (PROBIOTIC) 10 billion cell Cap, Take by mouth., Disp: , Rfl:     multivitamin-iron-folic acid Tab, Take by mouth., Disp: , Rfl:     tretinoin (RETIN-A) 0.025 % cream, Apply topically every evening., Disp: 20 g, Rfl: 4    valACYclovir (VALTREX) 500 MG tablet, TAKE 1 TABLET BY MOUTH EVERY DAY, Disp: 90 tablet, Rfl: 0     OB History            2    Para   2    Term   2            AB        Living   2           SAB        IAB        Ectopic        Multiple        Live Births   2             Obstetric Comments   Vaginal delivery x 2                   Age of Menarche:12  Age at first pregnancy:30   Age at first live birth:31  Number of months breastfeeding:    Age at  "Menopause:    Comments:        [Social History]    [Social History]        Tobacco Use    Smoking status: Never       Passive exposure: Never    Smokeless tobacco: Never   Substance Use Topics    Alcohol use: Never    Drug use: Never               Family History   Problem Relation Name Age of Onset    Ovarian cancer Mother        Colon cancer Neg Hx        Crohn's disease Neg Hx        Ulcerative colitis Neg Hx             Past medical and surgical history reviewed.   I have reviewed the patient's medical history in detail and updated the computerized patient record.     Review of Systems (at today's evaluation)  Review of Systems   Constitutional:  Negative for fever and unexpected weight change.   HENT:  Positive for tinnitus.    Respiratory:  Negative for cough and shortness of breath.    Cardiovascular:  Negative for chest pain.   Gastrointestinal:  Positive for abdominal distention (bloating). Negative for constipation, diarrhea and nausea.   Genitourinary:  Negative for dysuria and frequency.          GYN AS PER HPI   Musculoskeletal: Negative.    Skin: Negative.    Neurological: Negative.    Psychiatric/Behavioral:  Positive for sleep disturbance.          Physical Exam:   BP (!) 150/78 (BP Location: Right arm, Patient Position: Sitting)   Ht 5' 9" (1.753 m)   Wt 70.5 kg (155 lb 6.8 oz)   LMP 08/02/2023   BMI 22.95 kg/m²   Prior exam  Physical Exam:   Constitutional: She appears well-developed and well-nourished.    HENT:   Head: Normocephalic.     Neck: No thyroid mass present.    Cardiovascular:  Normal rate.             Pulmonary/Chest: Effort normal. Right breast exhibits augmentation. Right breast exhibits no mass, no nipple discharge and no skin change. Left breast exhibits augmentation. Left breast exhibits no mass, no nipple discharge and no skin change.         Abdominal: Soft. There is no abdominal tenderness.     Genitourinary:    Inguinal canal, vagina, right adnexa and left adnexa normal.     "  Pelvic exam was performed with patient supine.   The external female genitalia was normal.   No external genitalia lesions identified,Cervix is normal. Right adnexum displays no mass and no tenderness. Left adnexum displays no mass and no tenderness. No tenderness or bleeding in the vagina. Uterus is enlarged (14 weeks in size) and hosting fibroids. Uterus is not tender. Normal urethral meatus.Urethra findings: no tendernessBladder findings: no bladder tenderness   Genitourinary Comments: Chaperone (female medical assistant) present throughout physical exam.             Musculoskeletal:      Right lower leg: No edema.      Left lower leg: No edema.      Lymphadenopathy:     She has no cervical adenopathy. No inguinal adenopathy noted on the right or left side.    Neurological: She is alert.   No gross defects noted    Skin: Skin is warm and dry.    Psychiatric: Mood normal.      Recent Laboratory Results:     Pap smear from 2/21/2025 noted no cervical cell abnormalities and was HPV negative.       Recent Radiology Results:      2/26/2025 Routine      Narrative & Impression  EXAMINATION:  US PELVIS COMP WITH TRANSVAG NON-OB (XPD)     CLINICAL HISTORY:  Leiomyoma of uterus, unspecified     FINDINGS:  Uterus:     Size: 12.4 x 7.6 cm     Masses: In the uterine fundus is a 7.6 cm heterogeneous fibroid and in the left myometrium is a 3 cm fibroid.     Endometrium: Normal in this post menopausal patient, measuring 6.7 mm.     Right ovary:     Not seen.     Left ovary:     Size: 2.8 x 2 cm     Appearance: There is a 1.4 cm hypoechoic probably bloody cyst.  Follow-up however is recommended.     Vascular Flow: Normal.     Free Fluid:     None.     Impression:     Two uterine fibroids the largest measuring 7.6 cm.  The 2nd fibroid is 3 cm.  A 1.4 cm hypoechoic probable bloody cyst of the left ovary is seen and follow-up is recommended.  The right ovary is not identified today     Assessment:      Assessment  1. Uterine  leiomyoma, unspecified location    2. Pelvic pain       Plan:        Plan  Uterine leiomyoma, unspecified location     Pelvic pain        Follow up for As needed or 2 weeks following surgery.      The above was reviewed and discussed with the patient.     We once again discussed the results of her recent ultrasound and her abdominal pelvic issues..     Conservative, medical, and surgical interventions (including IR) were discussed, and the patient would like to proceed with surgical intervention.  She will be scheduled for a ROBOTIC ASSISTED TOTAL LAPAROSCOPIC HYSTERECTOMY, BILATERAL SALPINGECTOMY, POSSIBLE BILATERAL OOPHORECTOMY, CYTOSCOPY AND OTHER INDICATED PROCEDURES (The patient is considering removal of her ovaries at this time)     The pros, cons, risks, benefits, alternatives, and indications of the surgical procedure were discussed in detail with the patient.  We discussed the possibility of allergic reactions, bleeding, infection damage to surrounding structures (cervix, uterus, bladder, ureters, bowel) and other abdominal pelvic organs.  Issues unique to this procedure were discussed.     The patient's questions regarding above were answered and she agrees with this plan.  The patient was provided with the informed consent form and given times reviewed the form.  Questions were answered and consent was obtained        Casper Whitley MD  Department OBGYN  [facility]

## 2025-06-02 ENCOUNTER — ANESTHESIA (OUTPATIENT)
Dept: SURGERY | Facility: HOSPITAL | Age: 55
End: 2025-06-02
Payer: COMMERCIAL

## 2025-06-02 ENCOUNTER — HOSPITAL ENCOUNTER (OUTPATIENT)
Facility: HOSPITAL | Age: 55
Discharge: HOME | End: 2025-06-02
Attending: OBSTETRICS & GYNECOLOGY | Admitting: OBSTETRICS & GYNECOLOGY
Payer: COMMERCIAL

## 2025-06-02 DIAGNOSIS — D21.9 FIBROID: Primary | ICD-10-CM

## 2025-06-02 DIAGNOSIS — D25.9 UTERINE LEIOMYOMA, UNSPECIFIED LOCATION: ICD-10-CM

## 2025-06-02 DIAGNOSIS — R10.2 PELVIC PAIN: ICD-10-CM

## 2025-06-02 LAB
B-HCG UR QL: NEGATIVE
CTP QC/QA: YES

## 2025-06-02 PROCEDURE — 63600175 PHARM REV CODE 636 W HCPCS: Performed by: OBSTETRICS & GYNECOLOGY

## 2025-06-02 PROCEDURE — 63600175 PHARM REV CODE 636 W HCPCS: Performed by: ANESTHESIOLOGY

## 2025-06-02 PROCEDURE — 81025 URINE PREGNANCY TEST: CPT | Performed by: ANESTHESIOLOGY

## 2025-06-02 PROCEDURE — 37000008 HC ANESTHESIA 1ST 15 MINUTES: Performed by: OBSTETRICS & GYNECOLOGY

## 2025-06-02 PROCEDURE — 25000003 PHARM REV CODE 250: Performed by: OBSTETRICS & GYNECOLOGY

## 2025-06-02 PROCEDURE — 25000003 PHARM REV CODE 250: Performed by: ANESTHESIOLOGY

## 2025-06-02 PROCEDURE — 36000712 HC OR TIME LEV V 1ST 15 MIN: Performed by: OBSTETRICS & GYNECOLOGY

## 2025-06-02 PROCEDURE — 58571 TLH W/T/O 250 G OR LESS: CPT | Mod: ,,, | Performed by: OBSTETRICS & GYNECOLOGY

## 2025-06-02 PROCEDURE — 94799 UNLISTED PULMONARY SVC/PX: CPT

## 2025-06-02 PROCEDURE — 27201423 OPTIME MED/SURG SUP & DEVICES STERILE SUPPLY: Performed by: OBSTETRICS & GYNECOLOGY

## 2025-06-02 PROCEDURE — 25000003 PHARM REV CODE 250: Performed by: NURSE ANESTHETIST, CERTIFIED REGISTERED

## 2025-06-02 PROCEDURE — 71000015 HC POSTOP RECOV 1ST HR: Performed by: OBSTETRICS & GYNECOLOGY

## 2025-06-02 PROCEDURE — 71000033 HC RECOVERY, INTIAL HOUR: Performed by: OBSTETRICS & GYNECOLOGY

## 2025-06-02 PROCEDURE — 63600175 PHARM REV CODE 636 W HCPCS: Mod: TB,JZ | Performed by: NURSE ANESTHETIST, CERTIFIED REGISTERED

## 2025-06-02 PROCEDURE — 63600175 PHARM REV CODE 636 W HCPCS: Performed by: NURSE ANESTHETIST, CERTIFIED REGISTERED

## 2025-06-02 PROCEDURE — 37000009 HC ANESTHESIA EA ADD 15 MINS: Performed by: OBSTETRICS & GYNECOLOGY

## 2025-06-02 PROCEDURE — 36000713 HC OR TIME LEV V EA ADD 15 MIN: Performed by: OBSTETRICS & GYNECOLOGY

## 2025-06-02 PROCEDURE — 71000039 HC RECOVERY, EACH ADD'L HOUR: Performed by: OBSTETRICS & GYNECOLOGY

## 2025-06-02 PROCEDURE — 71000016 HC POSTOP RECOV ADDL HR: Performed by: OBSTETRICS & GYNECOLOGY

## 2025-06-02 RX ORDER — SCOPOLAMINE 1 MG/3D
1 PATCH, EXTENDED RELEASE TRANSDERMAL
Status: DISCONTINUED | OUTPATIENT
Start: 2025-06-02 | End: 2025-06-02 | Stop reason: HOSPADM

## 2025-06-02 RX ORDER — MIDAZOLAM HYDROCHLORIDE 1 MG/ML
INJECTION INTRAMUSCULAR; INTRAVENOUS
Status: DISCONTINUED | OUTPATIENT
Start: 2025-06-02 | End: 2025-06-02

## 2025-06-02 RX ORDER — LIDOCAINE HYDROCHLORIDE 20 MG/ML
INJECTION INTRAVENOUS
Status: DISCONTINUED | OUTPATIENT
Start: 2025-06-02 | End: 2025-06-02

## 2025-06-02 RX ORDER — CEFAZOLIN 2 G/1
2 INJECTION, POWDER, FOR SOLUTION INTRAMUSCULAR; INTRAVENOUS
Status: COMPLETED | OUTPATIENT
Start: 2025-06-02 | End: 2025-06-02

## 2025-06-02 RX ORDER — SODIUM CHLORIDE, SODIUM LACTATE, POTASSIUM CHLORIDE, CALCIUM CHLORIDE 600; 310; 30; 20 MG/100ML; MG/100ML; MG/100ML; MG/100ML
INJECTION, SOLUTION INTRAVENOUS CONTINUOUS
Status: DISCONTINUED | OUTPATIENT
Start: 2025-06-02 | End: 2025-06-02 | Stop reason: HOSPADM

## 2025-06-02 RX ORDER — FENTANYL CITRATE 50 UG/ML
INJECTION, SOLUTION INTRAMUSCULAR; INTRAVENOUS
Status: DISCONTINUED | OUTPATIENT
Start: 2025-06-02 | End: 2025-06-02

## 2025-06-02 RX ORDER — MUPIROCIN 20 MG/G
OINTMENT TOPICAL
Status: DISCONTINUED | OUTPATIENT
Start: 2025-06-02 | End: 2025-06-02 | Stop reason: HOSPADM

## 2025-06-02 RX ORDER — ONDANSETRON HYDROCHLORIDE 2 MG/ML
INJECTION, SOLUTION INTRAVENOUS
Status: DISCONTINUED | OUTPATIENT
Start: 2025-06-02 | End: 2025-06-02

## 2025-06-02 RX ORDER — DIPHENHYDRAMINE HYDROCHLORIDE 50 MG/ML
12.5 INJECTION, SOLUTION INTRAMUSCULAR; INTRAVENOUS EVERY 6 HOURS PRN
Status: DISCONTINUED | OUTPATIENT
Start: 2025-06-02 | End: 2025-06-02 | Stop reason: HOSPADM

## 2025-06-02 RX ORDER — GLUCAGON 1 MG
1 KIT INJECTION
Status: DISCONTINUED | OUTPATIENT
Start: 2025-06-02 | End: 2025-06-02 | Stop reason: HOSPADM

## 2025-06-02 RX ORDER — SUCCINYLCHOLINE CHLORIDE 20 MG/ML
INJECTION INTRAMUSCULAR; INTRAVENOUS
Status: DISCONTINUED | OUTPATIENT
Start: 2025-06-02 | End: 2025-06-02

## 2025-06-02 RX ORDER — PHENYLEPHRINE HYDROCHLORIDE 10 MG/ML
INJECTION INTRAVENOUS
Status: DISCONTINUED | OUTPATIENT
Start: 2025-06-02 | End: 2025-06-02

## 2025-06-02 RX ORDER — HYDROMORPHONE HYDROCHLORIDE 2 MG/ML
0.2 INJECTION, SOLUTION INTRAMUSCULAR; INTRAVENOUS; SUBCUTANEOUS EVERY 5 MIN PRN
Status: DISCONTINUED | OUTPATIENT
Start: 2025-06-02 | End: 2025-06-02 | Stop reason: HOSPADM

## 2025-06-02 RX ORDER — PROPOFOL 10 MG/ML
VIAL (ML) INTRAVENOUS
Status: DISCONTINUED | OUTPATIENT
Start: 2025-06-02 | End: 2025-06-02

## 2025-06-02 RX ORDER — PHENAZOPYRIDINE HYDROCHLORIDE 100 MG/1
200 TABLET, FILM COATED ORAL
Status: COMPLETED | OUTPATIENT
Start: 2025-06-02 | End: 2025-06-02

## 2025-06-02 RX ORDER — SODIUM CHLORIDE 9 MG/ML
INJECTION, SOLUTION INTRAVENOUS CONTINUOUS
Status: DISCONTINUED | OUTPATIENT
Start: 2025-06-02 | End: 2025-06-02 | Stop reason: HOSPADM

## 2025-06-02 RX ORDER — DEXAMETHASONE SODIUM PHOSPHATE 4 MG/ML
INJECTION, SOLUTION INTRA-ARTICULAR; INTRALESIONAL; INTRAMUSCULAR; INTRAVENOUS; SOFT TISSUE
Status: DISCONTINUED | OUTPATIENT
Start: 2025-06-02 | End: 2025-06-02

## 2025-06-02 RX ORDER — ACETAMINOPHEN 10 MG/ML
INJECTION, SOLUTION INTRAVENOUS
Status: DISCONTINUED | OUTPATIENT
Start: 2025-06-02 | End: 2025-06-02

## 2025-06-02 RX ORDER — OXYCODONE HYDROCHLORIDE 5 MG/1
5 TABLET ORAL ONCE AS NEEDED
Status: COMPLETED | OUTPATIENT
Start: 2025-06-02 | End: 2025-06-02

## 2025-06-02 RX ORDER — KETOROLAC TROMETHAMINE 30 MG/ML
INJECTION, SOLUTION INTRAMUSCULAR; INTRAVENOUS
Status: DISCONTINUED | OUTPATIENT
Start: 2025-06-02 | End: 2025-06-02

## 2025-06-02 RX ORDER — LIDOCAINE HYDROCHLORIDE 10 MG/ML
1 INJECTION, SOLUTION EPIDURAL; INFILTRATION; INTRACAUDAL; PERINEURAL ONCE
Status: COMPLETED | OUTPATIENT
Start: 2025-06-02 | End: 2025-06-02

## 2025-06-02 RX ORDER — BUPIVACAINE HYDROCHLORIDE 5 MG/ML
INJECTION, SOLUTION EPIDURAL; INTRACAUDAL; PERINEURAL
Status: DISCONTINUED | OUTPATIENT
Start: 2025-06-02 | End: 2025-06-02 | Stop reason: HOSPADM

## 2025-06-02 RX ORDER — ONDANSETRON HYDROCHLORIDE 2 MG/ML
4 INJECTION, SOLUTION INTRAVENOUS ONCE AS NEEDED
Status: DISCONTINUED | OUTPATIENT
Start: 2025-06-02 | End: 2025-06-02 | Stop reason: HOSPADM

## 2025-06-02 RX ORDER — ROCURONIUM BROMIDE 10 MG/ML
INJECTION, SOLUTION INTRAVENOUS
Status: DISCONTINUED | OUTPATIENT
Start: 2025-06-02 | End: 2025-06-02

## 2025-06-02 RX ORDER — IBUPROFEN 600 MG/1
600 TABLET, FILM COATED ORAL EVERY 6 HOURS PRN
Qty: 40 TABLET | Refills: 1 | Status: SHIPPED | OUTPATIENT
Start: 2025-06-02

## 2025-06-02 RX ORDER — OXYCODONE HYDROCHLORIDE 5 MG/1
5 TABLET ORAL
Status: DISCONTINUED | OUTPATIENT
Start: 2025-06-02 | End: 2025-06-02 | Stop reason: HOSPADM

## 2025-06-02 RX ORDER — OXYCODONE AND ACETAMINOPHEN 5; 325 MG/1; MG/1
1 TABLET ORAL EVERY 4 HOURS PRN
Qty: 18 TABLET | Refills: 0 | Status: SHIPPED | OUTPATIENT
Start: 2025-06-02

## 2025-06-02 RX ORDER — METOCLOPRAMIDE HYDROCHLORIDE 5 MG/ML
10 INJECTION INTRAMUSCULAR; INTRAVENOUS EVERY 10 MIN PRN
Status: DISCONTINUED | OUTPATIENT
Start: 2025-06-02 | End: 2025-06-02 | Stop reason: HOSPADM

## 2025-06-02 RX ORDER — FENTANYL CITRATE 50 UG/ML
25 INJECTION, SOLUTION INTRAMUSCULAR; INTRAVENOUS EVERY 5 MIN PRN
Status: COMPLETED | OUTPATIENT
Start: 2025-06-02 | End: 2025-06-02

## 2025-06-02 RX ADMIN — MUPIROCIN 1 G: 20 OINTMENT TOPICAL at 12:06

## 2025-06-02 RX ADMIN — PROPOFOL 150 MG: 10 INJECTION, EMULSION INTRAVENOUS at 01:06

## 2025-06-02 RX ADMIN — SUCCINYLCHOLINE CHLORIDE 120 MG: 20 INJECTION, SOLUTION INTRAMUSCULAR; INTRAVENOUS at 01:06

## 2025-06-02 RX ADMIN — SCOPOLAMINE 1 PATCH: 1.5 PATCH, EXTENDED RELEASE TRANSDERMAL at 12:06

## 2025-06-02 RX ADMIN — MIDAZOLAM HYDROCHLORIDE 2 MG: 1 INJECTION, SOLUTION INTRAMUSCULAR; INTRAVENOUS at 01:06

## 2025-06-02 RX ADMIN — LIDOCAINE HYDROCHLORIDE 10 MG: 10 INJECTION, SOLUTION EPIDURAL; INFILTRATION; INTRACAUDAL; PERINEURAL at 12:06

## 2025-06-02 RX ADMIN — ONDANSETRON 4 MG: 2 INJECTION INTRAMUSCULAR; INTRAVENOUS at 01:06

## 2025-06-02 RX ADMIN — DEXAMETHASONE SODIUM PHOSPHATE 4 MG: 4 INJECTION, SOLUTION INTRA-ARTICULAR; INTRALESIONAL; INTRAMUSCULAR; INTRAVENOUS; SOFT TISSUE at 01:06

## 2025-06-02 RX ADMIN — FENTANYL CITRATE 25 MCG: 50 INJECTION INTRAMUSCULAR; INTRAVENOUS at 04:06

## 2025-06-02 RX ADMIN — ACETAMINOPHEN 1000 MG: 10 INJECTION INTRAVENOUS at 01:06

## 2025-06-02 RX ADMIN — SUGAMMADEX 100 MG: 100 INJECTION, SOLUTION INTRAVENOUS at 03:06

## 2025-06-02 RX ADMIN — CEFAZOLIN 2 G: 2 INJECTION, POWDER, FOR SOLUTION INTRAMUSCULAR; INTRAVENOUS at 01:06

## 2025-06-02 RX ADMIN — LIDOCAINE HYDROCHLORIDE 100 MG: 20 INJECTION, SOLUTION INTRAVENOUS at 01:06

## 2025-06-02 RX ADMIN — PHENYLEPHRINE HYDROCHLORIDE 100 MCG: 10 INJECTION INTRAVENOUS at 02:06

## 2025-06-02 RX ADMIN — ROCURONIUM BROMIDE 5 MG: 10 INJECTION, SOLUTION INTRAVENOUS at 01:06

## 2025-06-02 RX ADMIN — FENTANYL CITRATE 50 MCG: 50 INJECTION, SOLUTION INTRAMUSCULAR; INTRAVENOUS at 03:06

## 2025-06-02 RX ADMIN — OXYCODONE 5 MG: 5 TABLET ORAL at 04:06

## 2025-06-02 RX ADMIN — FENTANYL CITRATE 50 MCG: 50 INJECTION, SOLUTION INTRAMUSCULAR; INTRAVENOUS at 01:06

## 2025-06-02 RX ADMIN — SODIUM CHLORIDE, SODIUM GLUCONATE, SODIUM ACETATE, POTASSIUM CHLORIDE AND MAGNESIUM CHLORIDE: 526; 502; 368; 37; 30 INJECTION, SOLUTION INTRAVENOUS at 12:06

## 2025-06-02 RX ADMIN — ROCURONIUM BROMIDE 45 MG: 10 INJECTION, SOLUTION INTRAVENOUS at 02:06

## 2025-06-02 RX ADMIN — ONDANSETRON 4 MG: 2 INJECTION INTRAMUSCULAR; INTRAVENOUS at 03:06

## 2025-06-02 RX ADMIN — PHENAZOPYRIDINE HYDROCHLORIDE 200 MG: 100 TABLET ORAL at 12:06

## 2025-06-02 RX ADMIN — KETOROLAC TROMETHAMINE 15 MG: 30 INJECTION, SOLUTION INTRAMUSCULAR at 03:06

## 2025-06-02 NOTE — PLAN OF CARE
Patient prepared for surgery, resting in bed, no complaints voiced. Family signed up for text messaging, will need to call Isaiah (spouse) for discharge . Belongings brought over to post op cabinet. IS teaching performed. Fall risk agreement reviewed, armband placed. TEDs and SCDs on.

## 2025-06-02 NOTE — DISCHARGE INSTRUCTIONS
POSTOPERATIVE INSTRUCTIONS FOLLOWING MINIMALLY INVASIVE SURGERY: ROBOTIC HYSTERECTOMY & LAPAROSCOPY  Wound Care  If you had minimally invasive surgery you will most likely have a few small incisions on your abdomen.  These were closed with a dissolvable suture and covered with either skin glue or a bandage.  Once the glue or bandage starts to curl you can remove them gently in the shower.  It is okay to shower daily and clean the incision sites with a gentle soap and warm water.  Make sure to clean and dry them after you shower.  Do not take a tub bath until you have been seen for your initial postoperative evaluation.  Activity  Remember, you have just had surgery so try to get plenty of rest.    Avoid any heavy lifting following surgery.  The best way to  this is - If you have to make an effort lifting an object, do not lift it.  For the first 2 weeks following surgery, until you are seen for your postoperative evaluation, decreased activity is recommended.  Rest, but do not only stay in bed.  You should move around frequently, sit in chairs couch etc...  Moving and walking around will decrease the risk of blood clots in your legs.  Climbing stairs is okay but try to limit too much exertion.  Pelvic rest, nothing in the vagina, for at least 4 weeks following hysterectomy / 2 weeks following laparoscopic surgery is recommended.  This includes intercourse.  No driving until you have been seen for your postoperative evaluation at approximately 2 weeks.  Pain Management  You will be discharged on a prescription strength ibuprofen, usually 600 mg.  You can take 1 every 6 hours but no more than a total of 2400 mg in a 24-hour period.  In addition, a prescription for a narcotic/acetaminophen pain medication, such as Percocet, will be given.  You may take 1 or 2 tablets every 4 hours as needed for breakthrough pain (pain which is not responding to the ibuprofen).  If you are using the narcotic acetaminophen (usually  Percocet) pain medication, due NOT USE ADDITIONAL MEDICATIONS THAT CONTAIN ACETAMINOPHEN SUCH AS TYLENOL.  Most narcotic pain medications can cause constipation.  Drink lots of water and eat fruits, vegetables and whole grain foods.  Over-the-counter medications such as stool softeners, an example is Colace, as well as milk of magnesia may be used if necessary.  Issues to notify the office about   Fevers (temperature > 100.4) and chills  Significant abdominal pelvic pain unresponsive to the pain medications  Nausea and vomiting  Difficulty urinating or burning when you urinate  Foul drainage from the incision sites or the vagina  In the event you note SHORTNESS OF BREATH OR CHEST PAIN please proceed immediately to the NEAREST EMERGENCY ROOM OR CALL 911  Other  In most cases, you can resume a regular diet once you get home  You will usually be seen 2 weeks and once again 6 weeks following surgery if there are no postoperative issues.  You will be seen sooner or more often if necessary.  Remember if you have any questions or concerns you can always call the office or send a message via the [facility] patient portal.    Casper Whitley MD  [facility] [place], [place]  [phone]          Discharge Instructions: After Your Surgery/Procedure  Youve just had surgery. During surgery you were given medicine called anesthesia to keep you relaxed and free of pain. After surgery you may have some pain or nausea. This is common. Here are some tips for feeling better and getting well after surgery.     Stay on schedule with your medication.   Going home  Your doctor or nurse will show you how to take care of yourself when you go home. He or she will also answer your questions. Have an adult family member or friend drive you home.      For your safety we recommend these precaution for the first 24 hours after your procedure:  Do not drive or use heavy equipment.  Do not make important decisions or sign legal  "papers.  Do not drink alcohol.  Have someone stay with you, if needed. He or she can watch for problems and help keep you safe.  Your concentration, balance, coordination, and judgement may be impaired for many hours after anesthesia.  Use caution when ambulating or standing up.     You may feel weak and "washed out" after anesthesia and surgery.      Subtle residual effects of general anesthesia or sedation with regional / local anesthesia can last more than 24 hours.  Rest for the remainder of the day or longer if your Doctor/Surgeon has advised you to do so.  Although you may feel normal within the first 24 hours, your reflexes and mental ability may be impaired without you realizing it.  You may feel dizzy, lightheaded or sleepy for 24 hours or longer.      Be sure to go to all follow-up visits with your doctor. And rest after your surgery for as long as your doctor tells you to.  Coping with pain  If you have pain after surgery, pain medicine will help you feel better. Take it as told, before pain becomes severe. Also, ask your doctor or pharmacist about other ways to control pain. This might be with heat, ice, or relaxation. And follow any other instructions your surgeon or nurse gives you.  Tips for taking pain medicine  To get the best relief possible, remember these points:  Pain medicines can upset your stomach. Taking them with a little food may help.  Most pain relievers taken by mouth need at least 20 to 30 minutes to start to work.  Taking medicine on a schedule can help you remember to take it. Try to time your medicine so that you can take it before starting an activity. This might be before you get dressed, go for a walk, or sit down for dinner.  Constipation is a common side effect of pain medicines. Call your doctor before taking any medicines such as laxatives or stool softeners to help ease constipation. Also ask if you should skip any foods. Drinking lots of fluids and eating foods such as " fruits and vegetables that are high in fiber can also help. Remember, do not take laxatives unless your surgeon has prescribed them.  Drinking alcohol and taking pain medicine can cause dizziness and slow your breathing. It can even be deadly. Do not drink alcohol while taking pain medicine.  Pain medicine can make you react more slowly to things. Do not drive or run machinery while taking pain medicine.  Your health care provider may tell you to take acetaminophen to help ease your pain. Ask him or her how much you are supposed to take each day. Acetaminophen or other pain relievers may interact with your prescription medicines or other over-the-counter (OTC) drugs. Some prescription medicines have acetaminophen and other ingredients. Using both prescription and OTC acetaminophen for pain can cause you to overdose. Read the labels on your OTC medicines with care. This will help you to clearly know the list of ingredients, how much to take, and any warnings. It may also help you not take too much acetaminophen. If you have questions or do not understand the information, ask your pharmacist or health care provider to explain it to you before you take the OTC medicine.  Managing nausea  Some people have an upset stomach after surgery. This is often because of anesthesia, pain, or pain medicine, or the stress of surgery. These tips will help you handle nausea and eat healthy foods as you get better. If you were on a special food plan before surgery, ask your doctor if you should follow it while you get better. These tips may help:  Do not push yourself to eat. Your body will tell you when to eat and how much.  Start off with clear liquids and soup. They are easier to digest.  Next try semi-solid foods, such as mashed potatoes, applesauce, and gelatin, as you feel ready.  Slowly move to solid foods. Dont eat fatty, rich, or spicy foods at first.  Do not force yourself to have 3 large meals a day. Instead eat smaller  amounts more often.  Take pain medicines with a small amount of solid food, such as crackers or toast, to avoid nausea.     Call your surgeon if  You still have pain an hour after taking medicine. The medicine may not be strong enough.  You feel too sleepy, dizzy, or groggy. The medicine may be too strong.  You have side effects like nausea, vomiting, or skin changes, such as rash, itching, or hives.       If you have obstructive sleep apnea  You were given anesthesia medicine during surgery to keep you comfortable and free of pain. After surgery, you may have more apnea spells because of this medicine and other medicines you were given. The spells may last longer than usual.   At home:  Keep using the continuous positive airway pressure (CPAP) device when you sleep. Unless your health care provider tells you not to, use it when you sleep, day or night. CPAP is a common device used to treat obstructive sleep apnea.  Talk with your provider before taking any pain medicine, muscle relaxants, or sedatives. Your provider will tell you about the possible dangers of taking these medicines.  © 0542-8013 [employer]. [address] [place] [id number]. All rights reserved. This information is not intended as a substitute for professional medical care. Always follow your healthcare professional's instructions.          Using an Incentive Spirometer    An incentive spirometer is a device that helps you do deep breathing exercises. These exercises expand your lungs, aid in circulation, and help prevent pneumonia. Deep breathing exercises also help you breathe better and improve the function of your lungs by:  Keeping your lungs clear  Strengthening your breathing muscles  Helping prevent respiratory complications or problems  The incentive spirometer gives you a way to take an active part in recover. A nurse or therapist will teach you breathing exercises. To do these exercises, you will breathe in  through your mouth and not your nose. The incentive spirometer only works correctly if you breathe in through your mouth.  Steps to clear lungs  Step 1. Exhale normally. Then, inhale normally.  Relax and breathe out.  Step 2. Place your lips tightly around the mouthpiece.  Make sure the device is upright and not tilted.  Step 3. Inhale as much air as you can through the mouthpiece (don't breath through your nose).  Inhale slowly and deeply.  Hold your breath long enough to keep the balls or disk raised for at least 3 to 5 seconds, or as instructed by your healthcare provider.  Some spirometers have an indicator to let you know that you are breathing in too fast. If the indicator goes off, breathe in more slowly.  Step 4. Repeat the exercise regularly.  Do this exercise every hour while you're awake, or as instructed by your healthcare provider.  If you were taught deep breathing and coughing exercises, do them regularly as instructed by your healthcare provider.           Post op instructions for prevention of DVT  What is deep vein thrombosis?  Deep vein thrombosis (DVT) is the medical term for blood clots in the deep veins of the leg.  These blood clots can be dangerous.  A DVT can block a blood vessel and keep blood from getting where it needs to go.  Another problem is that the clot can travel to other parts of the body such as the lungs.  A clot that travels to the lungs is called a pulmonary embolus (PE) and can cause serious problems with breathing which can lead to death.  Am I at risk for DVT/PE?  If you are not very active, you are at risk of DVT.  Anyone confined to bed, sitting for long periods of time, recovering from surgery, etc. increases the risk of DVT.  Other risk factors are cancer diagnosis, certain medications, estrogen replacement in any form,older age, obesity, pregnancy, smoking, history of clotting disorders, and dehydration.  How will I know if I have a DVT?  Swelling in the lower  leg  Pain  Warmth, redness, hardness or bulging of the vein  If you have any of these symptoms, call your doctors office right away.  Some people will not have any symptoms until the clot moves to the lungs.  What are the symptoms of a PE?  Panting, shortness of breath, or trouble breathing  Sharp, knife-like chest pain when you breathe  Coughing or coughing up blood  Rapid heartbeat  If you have any of these symptoms or get worse quickly, call 911 for emergency treatment.  How can I prevent a DVT?  Avoid long periods of inactivity and dont cross your legs--get up and walk around every hour or so.  Stay active--walking after surgery is highly encouraged.  This means you should get out of the house and walk in the neighborhood.  Going up and down stairs will not impair healing (unless advised against such activity by your doctor).    Drink plenty of noncaffeinated, nonalcoholic fluids each day to prevent dehydration.  Wear special support stockings, if they have been advised by your doctor.  If you travel, stop at least once an hour and walk around.  Avoid smoking (assistance with stopping is available through your healthcare provider)  Always notify your doctor if you are not able to follow the post operative instructions that are given to you at the time of discharge.  It may be necessary to prescribe one of the medications available to prevent DVT.          We hope your stay was comfortable as you heal now, mend and rest.    For we have enjoyed taking care of you by giving your our best.    And as you get better, by regaining your health and strength;   We count it as a privilege to have served you and hope your time at [facility] was well spent.      Thank  You!!!

## 2025-06-02 NOTE — INTERVAL H&P NOTE
The patient has been examined and the H&P has been reviewed:    I concur with the findings and no changes have occurred since H&P was written.  Surgical plan reviewed and discussed with the patient and the patient does desire removal of ovaries at the time of surgery.  She has had previous ovarian surgery and may only have one ovary.    Surgery risks, benefits and alternative options discussed and understood by patient/family.    Assessment:  1.           Uterine leiomyoma, unspecified location   2.           Pelvic pain     Plan: a ROBOTIC ASSISTED TOTAL LAPAROSCOPIC HYSTERECTOMY, BILATERAL SALPINGECTOMY, POSSIBLE BILATERAL OOPHORECTOMY, CYTOSCOPY AND OTHER INDICATED PROCEDURES    Active Hospital Problems    Diagnosis  POA    *Pelvic pain [R10.2]  Yes    uterine myoma [D21.9]  Yes      Resolved Hospital Problems   No resolved problems to display.     Casper Whitley MD  [facility]

## 2025-06-02 NOTE — OP NOTE
Desires oophorectomy  Surgery: 6/2/2025  Assessment:  1.           Uterine leiomyoma, unspecified location   2.           Pelvic pain   Plan: a ROBOTIC ASSISTED TOTAL LAPAROSCOPIC HYSTERECTOMY, BILATERAL SALPINGECTOMY, POSSIBLE BILATERAL OOPHORECTOMY, CYTOSCOPY AND OTHER INDICATED PROCEDURES    [facility]  Department of Obstetrics & Gynecology  Operative Note      PATIENT NAME: Sherice Patrick    MRN: [id number]  TODAY'S DATE: 06/02/2025  ADMIT DATE: 6/2/2025                              OPERATIVE REPORT:  6/2/2025    SURGEON: Casper Whitley M.D., F.A.C.OSonalG.    ASSISTANT: Bri Greenberg CFA    PREOPERATIVE DIAGNOSIS:   1. Uterine myoma   2. Pelvic pain    POSTOPERATIVE DIAGNOSIS:   1. Uterine myoma   2. Pelvic pain    OPERATION:   1.  Robotic Assisted Total Laparoscopic Hysterectomy   2.  Bilateral salpingo-oophorectomy  3.  Cystoscopy.      ANESTHESIA: General.    ESTIMATED BLOOD LOSS: 50 cc.     URINE OUTPUT: 200 cc    IV FLUIDS: 1800 cc Crystalloid    FINDINGS:   No significant abnormalities of the upper abdomen or liver were noted.  The uterus was enlarged and myomatous in appearance.  The right ovary and fallopian tube were normal in appearance.   The left ovary and fallopian tube were normal in appearance.   On cystoscopy, no abnormalities of the bladder were noted.  Pyridium stained urine was visualized from both the right and left ureteral orifices.         PATHOLOGY:  Cervix, uterus, right fallopian tube and ovary and left fallopian tube and ovary        PROCEDURE IN DETAIL:     The patient was initially seen in the preoperative area.  The pros, cons, risks, benefits, alternatives, and indications of the procedure reviewed and discussed.  The patient confirmed her desire for bilateral salpingo oophorectomy at the time of hysterectomy.  The patient's questions were answered at this time and she is in agreement with the surgical plan.    The patient was taken to the operating room,  where general endotracheal anesthesia was initiated. The patient was prepped and draped in the usual sterile fashion in the dorsal lithotomy position in the [facility].     A safety / time-out procedure was now performed.  The patient received preoperative antibiotics and sequential compression hose were in place.    A Baker catheter was placed into the bladder.  The cervix and uterus were now evaluated, and an appropriate size PAM uterine manipulator was transfixed to the cervix and uterus in the usual fashion.     Attention was then turned to the abdomen.  The abdomen was evaluated for appropriate placement sites of the trocars.  At this time 0.5% plain Marcaine was injected at all trocar sites prior to the incisions.  A 10-mm umbilical skin incision was made sharply with the scalpel.  A Veress needle was placed with appropriate placement confirmed using a syringe and normal saline in the free-flow and negative pressure methods.  At this time an appropriate operative pneumoperitoneum was obtained.  An 8mm bladeless trocar was introduced through this incision, under direct visualization with the 5 mm laparoscope.  Following placement of the umbilical trocar an additional 2 lateral trocars were placed to aid in robotic surgery as well as a 4th left upper trocar for use by the assistant.    The robot was now docked in the usual fashion and instruments (the vessel sealer for the surgeon's right hand and the bipolar fenestrated grasper for the left hand.) were passed into the abdominal cavity under direct laparoscopic visualization.     The pelvis, including the right and left ureters, were now evaluated.     Attention was turned to the right adnexa.  With the aid of the vessel sealer the right infundibulopelvic ligament was isolated and grasped.  Cautery was applied and it was transected at an appropriate distance away from the ovary.  The location of the right ureter had previously been noted/evaluated.  This process  of clamp, cauterize and transect was carried along the pelvic wall, broad ligament, round ligament and cardinal ligaments up to the cervical vaginal junction.  This was performed from an anterior approach to avoid issues with the ureter.  At the level of the uterine artery, the uterine artery was skeletonized and direct cautery clamping and transection was performed noting good hemostasis.    Attention was now turned to the left adnexa.  In a similar fashion, the vessel sealer was used to grasp the infundibulopelvic ligament.  Cautery was applied and it was transected in a similar fashion to the right infundibulopelvic ligament..  This process of clamp, cauterize and transect was once again carried along the pelvic wall, broad ligament, round ligament and cardinal ligaments up to the cervical vaginal junction.  Again, and anterior approach was used to avoid issues with the ureter.  At the level of the uterine artery, the left uterine artery was skeletonized and direct cautery, clamping and transection was performed noting good hemostasis.    The vessel sealer was now exchanged with the monopolar scissors.  The anterior cul-de-sac was evaluated.  With the use of sharp dissection the bladder, without the use of cautery, the bladder was dissected free from the underlying structures.  With the bladder free and clear of the operative site, an anterior colporrhaphy was made with visualization of the cervical cup..  The colporrhaphy was continued circumferentially into the posterior cul-de-sac and ultimately the cervix was excised free from the vaginal tissue.    With transection of the cervix from the vaginal vault, the uterus, cervix, fallopian tubes and ovaries were now delivered through the vaginal vault.    The pelvis was irrigated.  The robotic scissors were replaced with the robotic needle .  The vaginal cuff was now closed with running V-Loc 0 absorbable suture. The pelvis was irrigated and good hemostasis  was noted.      While in the preoperative area, the patient received oral Pyridium.  The Baker catheter was now removed and cystoscopy was performed in the usual fashion with the cystoscope.  Pyridium stained urine was noted from both ureteral orifices.  The bladder was evaluated and no significant abnormalities were noted.  This was confirmed by OR staff. The cystoscope was removed.  The Baker was not replaced.     The robot was undocked.  The operative pneumoperitoneum was now released.  The trocars removed without difficulty.  The skin incisions were closed with 4-0 Vicryl in a subcuticular fashion followed by skin glue.     Final count was reported as correct per nursing.  The patient tolerated the procedure well.    Following the above I was asked to reassess the patient due to vaginal bleeding and a small left vaginal wall laceration (more than likely secondary to removing the large uterus closed was noted.  This was closed with 3-0 Vicryl in a series of interrupted sutures.  An additional non-bleeding laceration along the right periurethral area was also reapproximated 3-0 Vicryl.  Final evaluation noted good hemostasis.    Casper Whitley M.D., FACOG

## 2025-06-02 NOTE — PLAN OF CARE
Patient awake, alert, oriented. Vital signs stable. Patient verbalizes readiness for discharge. Discharge instructions reviewed with patient and patient's spouse. Patient and patient's spouse verbalized understanding. IV removed, catheter intact. Incisions to abdomen clean, dry, and intact. All belongings returned to patient. Patient transferred to car via wheelchair. Safety maintained.

## 2025-06-03 VITALS
HEART RATE: 54 BPM | SYSTOLIC BLOOD PRESSURE: 129 MMHG | OXYGEN SATURATION: 98 % | RESPIRATION RATE: 16 BRPM | BODY MASS INDEX: 22.22 KG/M2 | TEMPERATURE: 98 F | WEIGHT: 150 LBS | DIASTOLIC BLOOD PRESSURE: 61 MMHG | HEIGHT: 69 IN

## 2025-06-04 ENCOUNTER — RESULTS FOLLOW-UP (OUTPATIENT)
Dept: OBSTETRICS AND GYNECOLOGY | Facility: CLINIC | Age: 55
End: 2025-06-04

## 2025-06-07 DIAGNOSIS — A60.9 ANOGENITAL HERPES SIMPLEX VIRUS (HSV) INFECTION: ICD-10-CM

## 2025-06-09 ENCOUNTER — PATIENT MESSAGE (OUTPATIENT)
Dept: OBSTETRICS AND GYNECOLOGY | Facility: CLINIC | Age: 55
End: 2025-06-09
Payer: COMMERCIAL

## 2025-06-09 RX ORDER — VALACYCLOVIR HYDROCHLORIDE 500 MG/1
500 TABLET, FILM COATED ORAL
Qty: 90 TABLET | Refills: 0 | Status: SHIPPED | OUTPATIENT
Start: 2025-06-09

## 2025-06-10 ENCOUNTER — OFFICE VISIT (OUTPATIENT)
Dept: OBSTETRICS AND GYNECOLOGY | Facility: CLINIC | Age: 55
End: 2025-06-10
Payer: COMMERCIAL

## 2025-06-10 VITALS
WEIGHT: 149.69 LBS | SYSTOLIC BLOOD PRESSURE: 112 MMHG | BODY MASS INDEX: 22.17 KG/M2 | DIASTOLIC BLOOD PRESSURE: 70 MMHG | HEIGHT: 69 IN

## 2025-06-10 DIAGNOSIS — R10.2 PELVIC PAIN: ICD-10-CM

## 2025-06-10 DIAGNOSIS — D25.9 UTERINE LEIOMYOMA, UNSPECIFIED LOCATION: ICD-10-CM

## 2025-06-10 DIAGNOSIS — Z98.890 POSTOPERATIVE STATE: Primary | ICD-10-CM

## 2025-06-10 DIAGNOSIS — R21 RASH AND NONSPECIFIC SKIN ERUPTION: ICD-10-CM

## 2025-06-10 PROCEDURE — 99999 PR PBB SHADOW E&M-EST. PATIENT-LVL III: CPT | Mod: PBBFAC,,, | Performed by: OBSTETRICS & GYNECOLOGY

## 2025-06-10 NOTE — PROGRESS NOTES
OBGYN POSTOPERATIVE OFFICE NOTE      Subjective:   Chief Complaint:  Post-op Evaluation       Patient ID: Sherice Patrick is a  55 y.o. female.    Date: 06/15/2025     The patient is status post: Robotic Hysterectomy with Bilateral salpingo oophorectomy  on 2025.  Surgery Indication(s):  Pelvic pain and uterine myoma    From a postoperative standpoint she reports a rash in the thighs and low back area but is otherwise doing well.    She denies any menopausal issues.  She denies any postoperative fevers.   No significant postoperative GI or urologic issues.    No significant postoperative pain.   No significant postoperative bleeding.     Pathology:  UTERUS: WEIGHT: 198.2 g     - INACTIVE ENDOMETRIUM, ADENOMYOSIS, AND LEIOMYOMAS.     CERVIX:     - SQUAMOUS METAPLASIA AND CHRONIC CERVICITIS.     BILATERAL FALLOPIAN TUBES:     - NO SIGNIFICANT HISTOPATHOLOGIC ABNORMALITY.     BILATERAL OVARIES:     - ADHESIONS AND SMALL BENIGN CYSTS.            GYN & OB History  Patient's last menstrual period was 2023.     Date of Last Pap: 3/2/2025    OB History    Para Term  AB Living   2 2 2   2   SAB IAB Ectopic Multiple Live Births       2      # Outcome Date GA Lbr Dev/2nd Weight Sex Type Anes PTL Lv   2 Term            1 Term               Obstetric Comments   Vaginal delivery x 2       Past Medical History:   Diagnosis Date    Abnormal Pap smear of cervix     Anogenital herpesviral infection, unspecified     Daily suppressive therapy.  No recent outbreaks    Basal cell carcinoma     Colon polyp         Past Surgical History:   Procedure Laterality Date    AUGMENTATION OF BREAST Bilateral 2005    BREAST BIOPSY Right     Benign x3    CERVICAL BIOPSY  W/ LOOP ELECTRODE EXCISION      COLONOSCOPY      3 years ago in [place]    COLONOSCOPY N/A 2024    Procedure: COLONOSCOPY;  Surgeon: Gregorio Mckeon MD;  Location: [facility];  Service: Endoscopy;  Laterality: N/A;    CYSTOSCOPY N/A 2023     Procedure: CYSTOSCOPY;  Surgeon: Mary Kate Felipe MD;  Location: [facility];  Service: Urology;  Laterality: N/A;    CYSTOSCOPY N/A 10/23/2023    Procedure: CYSTOSCOPY;  Surgeon: Mary Kate Felipe MD;  Location: [facility];  Service: Urology;  Laterality: N/A;    ESOPHAGOGASTRODUODENOSCOPY N/A 2/5/2024    Procedure: EGD (ESOPHAGOGASTRODUODENOSCOPY);  Surgeon: Gregorio Mckeon MD;  Location: [facility] ENDO;  Service: Endoscopy;  Laterality: N/A;    KNEE SURGERY Left     LUMBAR FUSION  2010    OVARIAN CYST REMOVAL      Laparoscopy    ROBOTIC HYSTERECTOMY, WITH SALPINGO-OOPHORECTOMY Bilateral 6/2/2025    Procedure: ROBOTIC HYSTERECTOMY,WITH SALPINGO-OOPHORECTOMY;  Surgeon: Casper Whitley MD;  Location: [facility] OR;  Service: OB/GYN;  Laterality: Bilateral;    URETHRAL SLING  03/2022    TVT        Review of Systems  Review of Systems   Constitutional:  Negative for fever.        As per HPI, otherwise no significant postoperative unexpected issues are noted   Respiratory: Negative.  Negative for shortness of breath.    Cardiovascular:  Negative for chest pain and palpitations.   Gastrointestinal:  Negative for abdominal pain, constipation, nausea and vomiting.   Genitourinary:  Negative for dysuria and hematuria.   Integumentary:  Positive for rash.   Neurological: Negative.        Objective:      Vitals:    06/10/25 1020   BP: 112/70     Physical Exam:   Constitutional: She is oriented to person, place, and time. She appears well-developed and well-nourished.    HENT:   Head: Normocephalic.      Cardiovascular:  Normal rate and regular rhythm.             Pulmonary/Chest: Effort normal. No respiratory distress.        Abdominal: Soft. There is no abdominal tenderness. No hernia.   The laparoscopic incision sites appear to be healing well.  No drainage erythema or signs of infection.             Musculoskeletal:      Right lower leg: No edema.      Left lower leg: No edema.       Neurological: She is  alert and oriented to person, place, and time.    Skin: Skin is warm and dry. Rash (Small erythematous macular papular lesions primarily on back and a few lesions on thighs) noted.    Psychiatric: She has a normal mood and affect. Mood normal.          Assessment:        1. Postoperative state    2. Uterine leiomyoma, unspecified location    3. Pelvic pain    4. Rash and nonspecific skin eruption          Plan:      Postoperative state    Uterine leiomyoma, unspecified location    Pelvic pain    Rash and nonspecific skin eruption  -     hydrocortisone 2.5 % lotion; Apply to affected area 2 times daily  Dispense: 118 mL; Refill: 11       Follow up in about 5 weeks (around 7/15/2025) for F/U on today's evaluation, as needed / for any GYN related issues.     The above was reviewed discussed with the patient.  We discussed the findings that time of surgery as well as pathology.  We reviewed the photographic images.     From a postoperative standpoint, with the exception of the rash the patient is currently doing well.  Prescription for a mild steroid was provided.  We will base further evaluation treatment on the patient's status over time.      The patient's questions regarding the above were answered and she is in agreement with the current plan.    Casper Whitley MD  Department OBGYN [facility]

## 2025-06-15 PROBLEM — D21.9 FIBROID: Status: RESOLVED | Noted: 2023-09-12 | Resolved: 2025-06-15

## 2025-06-15 PROBLEM — R10.2 PELVIC PAIN: Status: RESOLVED | Noted: 2025-06-02 | Resolved: 2025-06-15

## 2025-06-15 RX ORDER — HYDROCORTISONE 25 MG/ML
LOTION TOPICAL
Qty: 118 ML | Refills: 11 | Status: SHIPPED | OUTPATIENT
Start: 2025-06-15 | End: 2026-06-15

## 2025-06-17 ENCOUNTER — PATIENT MESSAGE (OUTPATIENT)
Dept: OBSTETRICS AND GYNECOLOGY | Facility: CLINIC | Age: 55
End: 2025-06-17
Payer: COMMERCIAL

## 2025-06-17 ENCOUNTER — HOSPITAL ENCOUNTER (EMERGENCY)
Facility: HOSPITAL | Age: 55
Discharge: HOME | End: 2025-06-17
Attending: EMERGENCY MEDICINE
Payer: COMMERCIAL

## 2025-06-17 VITALS
TEMPERATURE: 98 F | HEART RATE: 59 BPM | WEIGHT: 150 LBS | DIASTOLIC BLOOD PRESSURE: 96 MMHG | HEIGHT: 69 IN | OXYGEN SATURATION: 100 % | BODY MASS INDEX: 22.22 KG/M2 | RESPIRATION RATE: 18 BRPM | SYSTOLIC BLOOD PRESSURE: 131 MMHG

## 2025-06-17 DIAGNOSIS — N99.820 POSTOPERATIVE VAGINAL BLEEDING FOLLOWING GENITOURINARY PROCEDURE: ICD-10-CM

## 2025-06-17 DIAGNOSIS — N93.9 VAGINAL BLEEDING: Primary | ICD-10-CM

## 2025-06-17 LAB
ABSOLUTE EOSINOPHIL (SMH): 0.09 K/UL
ABSOLUTE MONOCYTE (SMH): 0.32 K/UL (ref 0.3–1)
ABSOLUTE NEUTROPHIL COUNT (SMH): 3.2 K/UL (ref 1.8–7.7)
ALBUMIN SERPL-MCNC: 4.2 G/DL (ref 3.5–5.2)
ALP SERPL-CCNC: 53 UNIT/L (ref 40–150)
ALT SERPL-CCNC: 10 UNIT/L (ref 10–44)
ANION GAP (SMH): 8 MMOL/L (ref 8–16)
AST SERPL-CCNC: 25 UNIT/L (ref 11–45)
BASOPHILS # BLD AUTO: 0.03 K/UL
BASOPHILS NFR BLD AUTO: 0.6 %
BILIRUB SERPL-MCNC: 0.7 MG/DL (ref 0.1–1)
BUN SERPL-MCNC: 17 MG/DL (ref 6–20)
CALCIUM SERPL-MCNC: 9.5 MG/DL (ref 8.7–10.5)
CHLORIDE SERPL-SCNC: 105 MMOL/L (ref 95–110)
CO2 SERPL-SCNC: 27 MMOL/L (ref 23–29)
CREAT SERPL-MCNC: 0.8 MG/DL (ref 0.5–1.4)
ERYTHROCYTE [DISTWIDTH] IN BLOOD BY AUTOMATED COUNT: 12.3 % (ref 11.5–14.5)
GFR SERPLBLD CREATININE-BSD FMLA CKD-EPI: >60 ML/MIN/1.73/M2
GLUCOSE SERPL-MCNC: 101 MG/DL (ref 70–110)
HCT VFR BLD AUTO: 39 % (ref 37–48.5)
HCV AB SERPL QL IA: NORMAL
HGB BLD-MCNC: 12.5 GM/DL (ref 12–16)
HIV 1+2 AB+HIV1 P24 AG SERPL QL IA: NORMAL
HOLD SPECIMEN: NORMAL
HOLD SPECIMEN: NORMAL
IMM GRANULOCYTES # BLD AUTO: 0.01 K/UL (ref 0–0.04)
IMM GRANULOCYTES NFR BLD AUTO: 0.2 % (ref 0–0.5)
LYMPHOCYTES # BLD AUTO: 1.23 K/UL (ref 1–4.8)
MCH RBC QN AUTO: 30.5 PG (ref 27–31)
MCHC RBC AUTO-ENTMCNC: 32.1 G/DL (ref 32–36)
MCV RBC AUTO: 95 FL (ref 82–98)
NUCLEATED RBC (/100WBC) (SMH): 0 /100 WBC
PLATELET # BLD AUTO: 297 K/UL (ref 150–450)
PMV BLD AUTO: 9.2 FL (ref 9.2–12.9)
POTASSIUM SERPL-SCNC: 4.1 MMOL/L (ref 3.5–5.1)
PROT SERPL-MCNC: 7.6 GM/DL (ref 6–8.4)
RBC # BLD AUTO: 4.1 M/UL (ref 4–5.4)
RELATIVE EOSINOPHIL (SMH): 1.8 % (ref 0–8)
RELATIVE LYMPHOCYTE (SMH): 25.2 % (ref 18–48)
RELATIVE MONOCYTE (SMH): 6.5 % (ref 4–15)
RELATIVE NEUTROPHIL (SMH): 65.7 % (ref 38–73)
SODIUM SERPL-SCNC: 140 MMOL/L (ref 136–145)
WBC # BLD AUTO: 4.89 K/UL (ref 3.9–12.7)

## 2025-06-17 PROCEDURE — 85025 COMPLETE CBC W/AUTO DIFF WBC: CPT | Performed by: NURSE PRACTITIONER

## 2025-06-17 PROCEDURE — 87389 HIV-1 AG W/HIV-1&-2 AB AG IA: CPT | Performed by: EMERGENCY MEDICINE

## 2025-06-17 PROCEDURE — 86803 HEPATITIS C AB TEST: CPT | Performed by: EMERGENCY MEDICINE

## 2025-06-17 PROCEDURE — 36415 COLL VENOUS BLD VENIPUNCTURE: CPT | Performed by: NURSE PRACTITIONER

## 2025-06-17 PROCEDURE — 80053 COMPREHEN METABOLIC PANEL: CPT | Performed by: NURSE PRACTITIONER

## 2025-06-17 PROCEDURE — 99284 EMERGENCY DEPT VISIT MOD MDM: CPT

## 2025-06-17 NOTE — TELEPHONE ENCOUNTER
Copied from CRM #[id number]. Topic: General Inquiry - Patient Advice  >> Jun 17, 2025  9:24 AM Ana wrote:  Type:  Needs Medical Advice    Who Called: Pt  Symptoms (please be specific): Bleeding and bloodclot   How long has patient had these symptoms:  Today  Pharmacy name and phone #:      [id number] - [place] - [address]  [address] [address]  Phone: [phone] Fax: [phone]     Would the patient rather a call back or a response via [facility]? Call  Best Call Back Number: [phone]   Additional Information:

## 2025-06-17 NOTE — Clinical Note
"Sherice"Cindi Patrick was seen and treated in our emergency department on 6/17/2025.  She may return to work on 06/20/2025.       If you have any questions or concerns, please don't hesitate to call.      Lizz Ramsey NP"

## 2025-06-17 NOTE — ED PROVIDER NOTES
Encounter Date: 6/17/2025       History     Chief Complaint   Patient presents with    Vaginal Bleeding     Had a Hysterectomy with Dr. Su 2 weeks ago and now having vaginal bleeding started this AM.  Dime size clots.       Patient is a 55 y.o. female who presents to the ED 06/17/2025 with a chief complaint of Vaginal bleeding that started this morning.  Patient had a total abdominal hysterectomy a week ago with Dr. Whitley and has had minimal discharge or pain.  Not had any fever or dysuria.  She states she went back to work yesterday and was little more active than usual and today she woke up with some bright red blood.  She states it has slowed down and she did have a few small clots this morning.  She is not on any blood thinners.  She takes no other medications otherwise been feeling well.             Review of patient's allergies indicates:  No Known Allergies  Past Medical History:   Diagnosis Date    Abnormal Pap smear of cervix     Anogenital herpesviral infection, unspecified     Daily suppressive therapy.  No recent outbreaks    Basal cell carcinoma     Colon polyp      Past Surgical History:   Procedure Laterality Date    AUGMENTATION OF BREAST Bilateral 2005    BREAST BIOPSY Right     Benign x3    CERVICAL BIOPSY  W/ LOOP ELECTRODE EXCISION      COLONOSCOPY      3 years ago in [place]    COLONOSCOPY N/A 2/19/2024    Procedure: COLONOSCOPY;  Surgeon: Gregorio Mckeon MD;  Location: [facility];  Service: Endoscopy;  Laterality: N/A;    CYSTOSCOPY N/A 09/18/2023    Procedure: CYSTOSCOPY;  Surgeon: Mary Kate Felipe MD;  Location: [facility] OR;  Service: Urology;  Laterality: N/A;    CYSTOSCOPY N/A 10/23/2023    Procedure: CYSTOSCOPY;  Surgeon: Mary Kate Felipe MD;  Location: [facility] OR;  Service: Urology;  Laterality: N/A;    ESOPHAGOGASTRODUODENOSCOPY N/A 2/5/2024    Procedure: EGD (ESOPHAGOGASTRODUODENOSCOPY);  Surgeon: Gregorio Mckeon MD;  Location: [facility];  Service:  Endoscopy;  Laterality: N/A;    KNEE SURGERY Left     LUMBAR FUSION  2010    OVARIAN CYST REMOVAL      Laparoscopy    ROBOTIC HYSTERECTOMY, WITH SALPINGO-OOPHORECTOMY Bilateral 6/2/2025    Procedure: ROBOTIC HYSTERECTOMY,WITH SALPINGO-OOPHORECTOMY;  Surgeon: Casper Whitley MD;  Location: [facility];  Service: OB/GYN;  Laterality: Bilateral;    URETHRAL SLING  03/2022    TVT     Family History   Problem Relation Name Age of Onset    Ovarian cancer Mother      Colon cancer Neg Hx      Crohn's disease Neg Hx      Ulcerative colitis Neg Hx       Social History[1]  Review of Systems   Constitutional:  Negative for chills and fever.   Respiratory:  Negative for chest tightness and shortness of breath.    Cardiovascular:  Negative for chest pain.   Gastrointestinal:  Negative for abdominal pain, constipation, diarrhea, nausea and vomiting.   Genitourinary:  Positive for vaginal bleeding. Negative for dysuria.   Musculoskeletal:  Negative for arthralgias and myalgias.   Skin:  Negative for rash and wound.   Neurological:  Negative for syncope.   Hematological:  Does not bruise/bleed easily.       Physical Exam     Initial Vitals [06/17/25 1037]   BP Pulse Resp Temp SpO2   135/71 66 18 98.1 °F (36.7 °C) 99 %      MAP       --         Physical Exam    Nursing note and vitals reviewed.  Constitutional: Vital signs are normal. She appears well-developed and well-nourished.   HENT:   Head: Normocephalic and atraumatic.   Eyes: Pupils are equal, round, and reactive to light.   Neck: Neck supple.   Cardiovascular:  Normal rate, regular rhythm, normal heart sounds and intact distal pulses.     Exam reveals no gallop and no friction rub.       No murmur heard.  Pulmonary/Chest: Breath sounds normal. She has no wheezes. She has no rhonchi. She has no rales.   Abdominal: Abdomen is soft. Bowel sounds are normal. There is no abdominal tenderness. Hernia confirmed negative in the right inguinal area and confirmed negative in the left  inguinal area.   Genitourinary:    Vagina and uterus normal.      Pelvic exam was performed with patient supine.   There is no rash, tenderness, lesion or injury on the right labia. There is no rash, tenderness, lesion or injury on the left labia. Right adnexum displays no mass, no tenderness and no fullness. Left adnexum displays no mass, no tenderness and no fullness.    Genitourinary Comments: Trace bright red blood in vaginal vault.     Musculoskeletal:      Cervical back: Neck supple.     Lymphadenopathy: No inguinal adenopathy noted on the right or left side.   Neurological: She is alert and oriented to person, place, and time. She has normal strength.   Skin: Skin is warm, dry and intact.   Psychiatric: She has a normal mood and affect. Her speech is normal and behavior is normal.         ED Course   Procedures  Labs Reviewed   HEPATITIS C ANTIBODY - Normal       Result Value    Hep C Ab Interp Non-Reactive     HIV 1 / 2 ANTIBODY - Normal    HIV 1/2 Ag/Ab Non-Reactive     COMPREHENSIVE METABOLIC PANEL - Normal    Sodium 140      Potassium 4.1      Chloride 105      CO2 27      Glucose 101      BUN 17      Creatinine 0.8      Calcium 9.5      Protein Total 7.6      Albumin 4.2      Bilirubin Total 0.7      ALP 53      AST 25      ALT 10      Anion Gap 8      eGFR >60     CBC WITH DIFFERENTIAL - Normal    WBC 4.89      RBC 4.10      Hgb 12.5      Hct 39.0      MCV 95      MCH 30.5      MCHC 32.1      RDW 12.3      Platelet Count 297      MPV 9.2      Nucleated RBC 0      Neut % 65.7      Lymph % 25.2      Mono % 6.5      Eos % 1.8      Basophil % 0.6      Imm Grans % 0.2      Neut # 3.2      Lymph # 1.23      Mono # 0.32      Eos # 0.09      Baso # 0.03      Imm Grans # 0.01     HEP C VIRUS HOLD SPECIMEN    Extra Tube Hold for add-ons.     HIV VIRUS CONFIRMATION HOLD SPECIMEN    Extra Tube Hold for add-ons.     CBC W/ AUTO DIFFERENTIAL    Narrative:     The following orders were created for panel order Complete  Blood Count (CBC).  Procedure                               Abnormality         Status                     ---------                               -----------         ------                     CBC with Differential[[id number]]       Normal              Final result                 Please view results for these tests on the individual orders.          Imaging Results    None          Medications - No data to display  Medical Decision Making  Amount and/or Complexity of Data Reviewed  Labs: ordered.         APC / Resident Notes:   Patient is a 55 y.o. female who presents to the ED 06/17/2025 who Underwent emergent evaluation for vaginal bleeding status post hysterectomy 1 week ago.  Trace bleeding on exam that is light and minimal.  Vital signs normal.  Benign abdominal exam.  She is not febrile.  Hemoglobin normal.  Discussed with Dr. Whitley who agrees outpatient follow up this week is reasonable and recommend staying home from work until follow-up with OBGYN.  She is not pregnant.  No signs of acute infectious process requiring antibiotics.  Benign abdominal exam and I do not think further imaging or other diagnostic studies indicated at this time. Based on my clinical evaluation, I do not appreciate any immediate, emergent, or life threatening condition or etiology that warrants additional workup today and feel that the patient can be discharged with close follow up care. Case discussed with Dr. Sow who is agreeable to plan of care. Follow up and return precautions discussed; patient verbalized understanding and is agreeable to plan of care. Patient discharged home in stable condition.         Attending Attestation:     Physician Attestation Statement for NP/PA:       Other NP/PA Attestation Additions:    History of Present Illness: 55-year-old female presented for vaginal bleeding.    Medical Decision Making: Initial differential diagnosis included but not limited to postoperative bleeding, anemia, and over  exertion.  I am in agreement with the nurse practitioner's assessment, treatment, and plan of care.                        Medical Decision Making:   Differential Diagnosis:   Post op bleeding  DUB  anemia             Clinical Impression:  Final diagnoses:  [N93.9] Vaginal bleeding (Primary)  [N99.820] Postoperative vaginal bleeding following genitourinary procedure          ED Disposition Condition    Discharge Stable          ED Prescriptions    None       Follow-up Information       Follow up With Specialties Details Why Contact Info Additional Information    Casper Whitley MD Obstetrics and Gynecology In 2 days  [address]  [address]  [place] [place] [address]  [phone]       [facility] -  Emergency Medicine  As needed, If symptoms worsen [address] [place] [address] [address] [place] [address] 1st floor                 Lizz Ramsey NP  06/17/25 1634         [1]   Social History  Tobacco Use    Smoking status: Never     Passive exposure: Never    Smokeless tobacco: Never   Vaping Use    Vaping status: Never Used   Substance Use Topics    Alcohol use: Never    Drug use: Never        Carlos Sow MD  06/17/25 2275

## 2025-06-17 NOTE — TELEPHONE ENCOUNTER
Per DAVID Danielle, pt advised to go to ER to be evaluated. Contacted pt and informed her of this as well as notifying her that provider is not in today. Pt understood and will go to ER to get checked out. Will notify Dr. Whitley in case he would like pt to come in sooner than scheduled appt.

## 2025-06-19 ENCOUNTER — OFFICE VISIT (OUTPATIENT)
Dept: OBSTETRICS AND GYNECOLOGY | Facility: CLINIC | Age: 55
End: 2025-06-19
Payer: COMMERCIAL

## 2025-06-19 VITALS
SYSTOLIC BLOOD PRESSURE: 138 MMHG | HEIGHT: 69 IN | WEIGHT: 150.38 LBS | DIASTOLIC BLOOD PRESSURE: 80 MMHG | BODY MASS INDEX: 22.27 KG/M2

## 2025-06-19 DIAGNOSIS — Z98.890 POSTOPERATIVE STATE: Primary | ICD-10-CM

## 2025-06-19 DIAGNOSIS — N89.8 GRANULATION TISSUE AT VAGINAL VAULT: ICD-10-CM

## 2025-06-19 PROCEDURE — 99999 PR PBB SHADOW E&M-EST. PATIENT-LVL III: CPT | Mod: PBBFAC,,, | Performed by: OBSTETRICS & GYNECOLOGY

## 2025-07-15 ENCOUNTER — PATIENT MESSAGE (OUTPATIENT)
Dept: OBSTETRICS AND GYNECOLOGY | Facility: CLINIC | Age: 55
End: 2025-07-15

## 2025-07-15 ENCOUNTER — OFFICE VISIT (OUTPATIENT)
Dept: OBSTETRICS AND GYNECOLOGY | Facility: CLINIC | Age: 55
End: 2025-07-15
Payer: COMMERCIAL

## 2025-07-15 VITALS
WEIGHT: 151.88 LBS | DIASTOLIC BLOOD PRESSURE: 80 MMHG | BODY MASS INDEX: 22.43 KG/M2 | SYSTOLIC BLOOD PRESSURE: 122 MMHG

## 2025-07-15 DIAGNOSIS — D25.9 UTERINE LEIOMYOMA, UNSPECIFIED LOCATION: ICD-10-CM

## 2025-07-15 DIAGNOSIS — Z98.890 POSTOPERATIVE STATE: Primary | ICD-10-CM

## 2025-07-15 DIAGNOSIS — N95.1 MENOPAUSAL SYMPTOMS: ICD-10-CM

## 2025-07-15 PROCEDURE — 3079F DIAST BP 80-89 MM HG: CPT | Mod: CPTII,S$GLB,, | Performed by: OBSTETRICS & GYNECOLOGY

## 2025-07-15 PROCEDURE — 1159F MED LIST DOCD IN RCRD: CPT | Mod: CPTII,S$GLB,, | Performed by: OBSTETRICS & GYNECOLOGY

## 2025-07-15 PROCEDURE — 99999 PR PBB SHADOW E&M-EST. PATIENT-LVL III: CPT | Mod: PBBFAC,,, | Performed by: OBSTETRICS & GYNECOLOGY

## 2025-07-15 PROCEDURE — 3074F SYST BP LT 130 MM HG: CPT | Mod: CPTII,S$GLB,, | Performed by: OBSTETRICS & GYNECOLOGY

## 2025-07-15 PROCEDURE — 99024 POSTOP FOLLOW-UP VISIT: CPT | Mod: S$GLB,,, | Performed by: OBSTETRICS & GYNECOLOGY

## 2025-07-15 RX ORDER — ESTRADIOL 1 MG/1
1 TABLET ORAL DAILY
Qty: 90 EACH | Refills: 3 | Status: SHIPPED | OUTPATIENT
Start: 2025-07-15 | End: 2026-07-15

## 2025-07-15 NOTE — PROGRESS NOTES
OBGYN POSTOPERATIVE OFFICE NOTE      Subjective:   Chief Complaint:  Post-op Evaluation (Postop 6 week RTLH )       Patient ID: Sherice Patrick is a  55 y.o. female.    Date: 07/15/2025     The patient is status post: Robotic Hysterectomy with Bilateral salpingo oophorectomy  on 2025.  Surgery Indication(s):  Pelvic pain and uterine myoma    Pathology:  UTERUS: WEIGHT: 198.2 g   - INACTIVE ENDOMETRIUM, ADENOMYOSIS, AND LEIOMYOMAS.   CERVIX:   - SQUAMOUS METAPLASIA AND CHRONIC CERVICITIS.   BILATERAL FALLOPIAN TUBES:   - NO SIGNIFICANT HISTOPATHOLOGIC ABNORMALITY.   BILATERAL OVARIES:   - ADHESIONS AND SMALL BENIGN CYSTS.            From a postoperative standpoint, the patient's previous rash has resolved.    No distinct postoperative issues but she does report issues with difficulty sleeping and potential menopausal problems.    She denies any postoperative fevers.   No significant postoperative GI or urologic issues.    No significant postoperative pain.   No significant postoperative bleeding.     GYN & OB History  Patient's last menstrual period was 2023.     Date of Last Pap: 3/2/2025    OB History    Para Term  AB Living   2 2 2   2   SAB IAB Ectopic Multiple Live Births       2      # Outcome Date GA Lbr Dev/2nd Weight Sex Type Anes PTL Lv   2 Term            1 Term               Obstetric Comments   Vaginal delivery x 2       Past Medical History:   Diagnosis Date    Abnormal Pap smear of cervix     Anogenital herpesviral infection, unspecified     Daily suppressive therapy.  No recent outbreaks    Basal cell carcinoma     Colon polyp         Past Surgical History:   Procedure Laterality Date    AUGMENTATION OF BREAST Bilateral     BREAST BIOPSY Right     Benign x3    CERVICAL BIOPSY  W/ LOOP ELECTRODE EXCISION      COLONOSCOPY      3 years ago in [place]    COLONOSCOPY N/A 2024    Procedure: COLONOSCOPY;  Surgeon: Gregorio Mckeon MD;  Location: [facility];  Service:  Endoscopy;  Laterality: N/A;    CYSTOSCOPY N/A 09/18/2023    Procedure: CYSTOSCOPY;  Surgeon: Mary Kate Felipe MD;  Location: [facility];  Service: Urology;  Laterality: N/A;    CYSTOSCOPY N/A 10/23/2023    Procedure: CYSTOSCOPY;  Surgeon: Mary Kate Felipe MD;  Location: [facility];  Service: Urology;  Laterality: N/A;    ESOPHAGOGASTRODUODENOSCOPY N/A 2/5/2024    Procedure: EGD (ESOPHAGOGASTRODUODENOSCOPY);  Surgeon: Gregorio Mckeon MD;  Location: [facility] ENDO;  Service: Endoscopy;  Laterality: N/A;    KNEE SURGERY Left     LUMBAR FUSION  2010    OVARIAN CYST REMOVAL      Laparoscopy    ROBOTIC HYSTERECTOMY, WITH SALPINGO-OOPHORECTOMY Bilateral 6/2/2025    Procedure: ROBOTIC HYSTERECTOMY,WITH SALPINGO-OOPHORECTOMY;  Surgeon: Casper Whitley MD;  Location: [facility] OR;  Service: OB/GYN;  Laterality: Bilateral;    URETHRAL SLING  03/2022    TVT        Review of Systems  Review of Systems   Constitutional:  Negative for fever.        As per HPI, otherwise no significant postoperative unexpected issues are noted   Respiratory: Negative.  Negative for shortness of breath.    Cardiovascular:  Negative for chest pain and palpitations.   Gastrointestinal:  Negative for abdominal pain, constipation, nausea and vomiting.   Genitourinary:  Negative for dysuria and hematuria.   Neurological: Negative.    Psychiatric/Behavioral:  Positive for sleep disturbance.        Objective:      Vitals:    07/15/25 1321   BP: 122/80     Physical Exam:   Constitutional: She is oriented to person, place, and time. She appears well-developed and well-nourished.    HENT:   Head: Normocephalic.     Neck: No thyroid mass present.    Cardiovascular:  Normal rate, regular rhythm and normal heart sounds.             Pulmonary/Chest: Effort normal and breath sounds normal. No respiratory distress.        Abdominal: Soft. There is no abdominal tenderness. No hernia.   The laparoscopic incision sites appear to be healing well.  No  drainage erythema or signs of infection.     Genitourinary:    Inguinal canal, right adnexa and left adnexa normal.      Pelvic exam was performed with patient supine.   The external female genitalia was normal.   No external genitalia lesions identified,Right adnexum displays no tenderness and no fullness. Left adnexum displays no tenderness and no fullness. There is erythema in the vagina. No tenderness or bleeding in the vagina. Vaginal atrophy noted. Cervix is absent.Uterus is absent. Normal urethral meatus.Urethra findings: no tendernessBladder findings: no bladder tenderness   Genitourinary Comments: A small amount of suture is palpated at the vaginal apex.  No significant tenderness noted on exam.  Cuff appears to be healing well.    A chaperone (female medical assistant) was present throughout the physical exam.             Musculoskeletal:      Right lower leg: No edema.      Left lower leg: No edema.      Lymphadenopathy:     She has no cervical adenopathy. No inguinal adenopathy noted on the right or left side.    Neurological: She is alert and oriented to person, place, and time.    Skin: Skin is warm and dry.    Psychiatric: She has a normal mood and affect. Mood normal.          Assessment:        1. Postoperative state    2. Uterine leiomyoma, unspecified location    3. Menopausal symptoms      Plan:      Postoperative state    Uterine leiomyoma, unspecified location    Menopausal symptoms  -     estradioL (ESTRACE) 1 MG tablet; Take 1 tablet (1 mg total) by mouth once daily.  Dispense: 90 each; Refill: 3      Follow up in about 3 months (around 10/15/2025) for F/U on today's evaluation, as needed / for any GYN related issues.     The above was reviewed discussed with the patient.  We once again discussed the findings that time of surgery as well as pathology.    We reviewed the patient's menopausal type issues which are not unusual giving her age and bilateral oophorectomy.  Options reviewed and at  this time the patient is being started on estradiol 1 mg p.o. daily.  The pros, cons, risks, benefits, alternatives and indications of the medication(s) prescribed, as well as appropriate use and potential side effects were discussed.  We discussed issues and relevant risks associated with the medicine prescribed.  Oncologic and cardiovascular issues associated hormone replacement therapy were discussed.    The patient's questions regarding the above were answered and she is in agreement with the current plan.    Casper Whitley MD  Department OBN  [facility]

## 2025-07-25 ENCOUNTER — HOSPITAL ENCOUNTER (OUTPATIENT)
Dept: RADIOLOGY | Facility: HOSPITAL | Age: 55
Discharge: HOME | End: 2025-07-25
Attending: OBSTETRICS & GYNECOLOGY
Payer: COMMERCIAL

## 2025-07-25 DIAGNOSIS — Z12.31 ENCOUNTER FOR SCREENING MAMMOGRAM FOR MALIGNANT NEOPLASM OF BREAST: ICD-10-CM

## 2025-07-25 PROCEDURE — 77063 BREAST TOMOSYNTHESIS BI: CPT | Mod: 26,,, | Performed by: RADIOLOGY

## 2025-07-25 PROCEDURE — 77063 BREAST TOMOSYNTHESIS BI: CPT | Mod: TC,PO

## 2025-07-25 PROCEDURE — 77067 SCR MAMMO BI INCL CAD: CPT | Mod: 26,,, | Performed by: RADIOLOGY

## 2025-08-09 NOTE — PROGRESS NOTES
OBGYN POSTOPERATIVE OFFICE NOTE      Subjective:   Chief Complaint:  Post-op Evaluation       Patient ID: Sherice Patrick is a  55 y.o. female.    Date: 2025    The patient is status post: Robotic Hysterectomy with Bilateral salpingo oophorectomy  on 2025.  Surgery Indication(s):  Pelvic pain and uterine myoma      Pathology:  UTERUS: WEIGHT: 198.2 g     - INACTIVE ENDOMETRIUM, ADENOMYOSIS, AND LEIOMYOMAS.     CERVIX:     - SQUAMOUS METAPLASIA AND CHRONIC CERVICITIS.     BILATERAL FALLOPIAN TUBES:     - NO SIGNIFICANT HISTOPATHOLOGIC ABNORMALITY.     BILATERAL OVARIES:     - ADHESIONS AND SMALL BENIGN CYSTS.            On 2021 the patient was seen in the emergency room for vaginal bleeding.  She reports increasing her activity and working following which noted an episode of bleeding.  No pelvic pain.  No current bleeding.      She denies any menopausal issues.  She denies any postoperative fevers.   No significant postoperative GI or urologic issues.    No significant postoperative pain.   Her previous rash has resolved    GYN & OB History  Patient's last menstrual period was 2023.     Date of Last Pap: 3/2/2025    OB History    Para Term  AB Living   2 2 2   2   SAB IAB Ectopic Multiple Live Births       2      # Outcome Date GA Lbr Dev/2nd Weight Sex Type Anes PTL Lv   2 Term            1 Term               Obstetric Comments   Vaginal delivery x 2       Past Medical History:   Diagnosis Date    Abnormal Pap smear of cervix     Anogenital herpesviral infection, unspecified     Daily suppressive therapy.  No recent outbreaks    Basal cell carcinoma     Colon polyp         Past Surgical History:   Procedure Laterality Date    AUGMENTATION OF BREAST Bilateral     BREAST BIOPSY Right     Benign x3    CERVICAL BIOPSY  W/ LOOP ELECTRODE EXCISION      COLONOSCOPY      3 years ago in [place]    COLONOSCOPY N/A 2024    Procedure: COLONOSCOPY;  Surgeon: Gregorio Mckeon,  MD;  Location: [facility] ENDO;  Service: Endoscopy;  Laterality: N/A;    CYSTOSCOPY N/A 09/18/2023    Procedure: CYSTOSCOPY;  Surgeon: Mary Kate Felipe MD;  Location: [facility];  Service: Urology;  Laterality: N/A;    CYSTOSCOPY N/A 10/23/2023    Procedure: CYSTOSCOPY;  Surgeon: Mary Kate Felipe MD;  Location: [facility];  Service: Urology;  Laterality: N/A;    ESOPHAGOGASTRODUODENOSCOPY N/A 2/5/2024    Procedure: EGD (ESOPHAGOGASTRODUODENOSCOPY);  Surgeon: Gregorio Mckeon MD;  Location: [facility] END[facility];  Service: Endoscopy;  Laterality: N/A;    KNEE SURGERY Left     LUMBAR FUSION  2010    OVARIAN CYST REMOVAL      Laparoscopy    ROBOTIC HYSTERECTOMY, WITH SALPINGO-OOPHORECTOMY Bilateral 6/2/2025    Procedure: ROBOTIC HYSTERECTOMY,WITH SALPINGO-OOPHORECTOMY;  Surgeon: Casper Whitley MD;  Location: [facility] OR;  Service: OB/GYN;  Laterality: Bilateral;    URETHRAL SLING  03/2022    TVT        Review of Systems  Review of Systems   Constitutional:  Negative for fever.        As per HPI, otherwise no significant postoperative unexpected issues are noted   Respiratory: Negative.  Negative for shortness of breath.    Cardiovascular:  Negative for chest pain and palpitations.   Gastrointestinal:  Negative for abdominal pain, constipation, nausea and vomiting.   Genitourinary:  Negative for dysuria and hematuria.   Integumentary:  Negative for rash.   Neurological: Negative.        Objective:      Vitals:    06/19/25 1538   BP: 138/80     Physical Exam:   Constitutional: She is oriented to person, place, and time. She appears well-developed and well-nourished.    HENT:   Head: Normocephalic.     Neck: No thyroid mass present.    Cardiovascular:  Normal rate, regular rhythm and normal heart sounds.             Pulmonary/Chest: Effort normal and breath sounds normal. No respiratory distress.        Abdominal: Soft. There is no abdominal tenderness. No hernia.   The laparoscopic incision sites appear to be healing  well.  No drainage erythema or signs of infection.     Genitourinary:    Inguinal canal, right adnexa and left adnexa normal.      Pelvic exam was performed with patient supine.   The external female genitalia was normal.   No external genitalia lesions identified,Right adnexum displays no tenderness and no fullness. Left adnexum displays no tenderness and no fullness. There is bleeding in the vagina. No tenderness in the vagina.       Cervix is absent.Uterus is absent. Normal urethral meatus.Urethra findings: no tendernessBladder findings: no bladder tenderness   Genitourinary Comments: A chaperone (female medical assistant) was present throughout the physical exam.             Musculoskeletal:      Right lower leg: No edema.      Left lower leg: No edema.      Lymphadenopathy:     She has no cervical adenopathy. No inguinal adenopathy noted on the right or left side.    Neurological: She is alert and oriented to person, place, and time.    Skin: Skin is warm and dry. No rash (Small erythematous macular papular lesions primarily on back and a few lesions on thighs) noted.    Psychiatric: She has a normal mood and affect. Mood normal.          Assessment:        1. Postoperative state    2. Granulation tissue at vaginal vault        Plan:      Postoperative state    Granulation tissue at vaginal vault      Follow up in about 4 weeks (around 7/17/2025) for as needed / for any GYN related issues.     The above was reviewed discussed with the patient.  We once again discussed the findings time of surgery.      Findings of granulation tissue at the vaginal cuff as well as treatment with silver nitrate were discussed.  We discussed the significance of granulation tissue and the probability this was leading to her vaginal bleeding.    We will base further evaluation treatment on the patient's status over time.      The patient's questions regarding the above were answered and she is in agreement with the current  plan.    Casper Whitley MD  Department OBGYN  [facility]       Yes

## (undated) DEVICE — SYR 10CC LUER LOCK

## (undated) DEVICE — TOWEL OR DISP STRL BLUE 4/PK

## (undated) DEVICE — GLOVE SENSICARE PI ALOE 7.5

## (undated) DEVICE — DRAPE ARM DAVINCI XI

## (undated) DEVICE — Device

## (undated) DEVICE — OBTURATOR BLADELESS 8MM XI CLR

## (undated) DEVICE — NDL SAFETY 21G X 1 1/2 ECLPSE

## (undated) DEVICE — PAD PINK TRENDELENBURG POS XL

## (undated) DEVICE — GOWN POLY REINF X-LONG XL

## (undated) DEVICE — DRAPE UINDERBUT GRAD PCH

## (undated) DEVICE — SOL NACL IRR 1000ML BTL

## (undated) DEVICE — SET CYSTO IRR DRP CHMBR 84IN

## (undated) DEVICE — SYR ONLY LUER LOCK 20CC

## (undated) DEVICE — GOWN POLY REINF BRTH SLV XL

## (undated) DEVICE — PACK CUSTOM UNIV BASIN SLI

## (undated) DEVICE — TRAY SKIN SCRUB DRY PREMIUM

## (undated) DEVICE — GOWN POLY REINF BRTH SLV 2XL

## (undated) DEVICE — JELLY SURGILUBE LUBE TUBE 2OZ

## (undated) DEVICE — PACK SIRUS BASIC V SURG STRL

## (undated) DEVICE — GLOVE SENSICARE PI ALOE 6.5

## (undated) DEVICE — PAD SANITARY MAXI 11IN

## (undated) DEVICE — SOL NORMAL USPCA 0.9%

## (undated) DEVICE — SOL CLEARIFY VISUALIZATION LAP

## (undated) DEVICE — DRAPE MAJOR ABD 102X122X78IN

## (undated) DEVICE — BLADE SURG CARBON STEEL SZ11

## (undated) DEVICE — TIP RUMI KOH-EFFICIENT 4.0

## (undated) DEVICE — DRAPE COLUMN DAVINCI XI

## (undated) DEVICE — TROCAR ENDO Z THREAD KII 5X100

## (undated) DEVICE — SUT MCRYL PLUS 4-0 PS2 27IN

## (undated) DEVICE — GLOVE SENSICARE PI ALOE 7

## (undated) DEVICE — GLOVE SENSICARE PI GRN 8

## (undated) DEVICE — SET TUBE PNEUMOCLEAR SE HI FLO

## (undated) DEVICE — IRRIGATOR SUCTION W/TIP

## (undated) DEVICE — SCRUB DYNA-HEX LIQ 4% CHG 4OZ

## (undated) DEVICE — UNDERPAD ULTRASORB 300LB 30X36

## (undated) DEVICE — PENCIL SMK EVAC CONNECTOR 10FT

## (undated) DEVICE — SLEEVE SCD EXPRESS KNEE MEDIUM

## (undated) DEVICE — SUT VICRYL 3-0 27 SH

## (undated) DEVICE — COVER TIP CURVED SCISSORS XI

## (undated) DEVICE — DRAPE LEGGINGS CUFF 33X51IN

## (undated) DEVICE — SOL ELECTROLUBE ANTI-STIC

## (undated) DEVICE — TRAY CATH 1-LYR URIMTR 16FR

## (undated) DEVICE — SUT V-LOC 180 0 GS-22 9IN

## (undated) DEVICE — TIP RUMI MANIPULATOR LAVENDER

## (undated) DEVICE — GLOVE SENSICARE PI GRN 6.5

## (undated) DEVICE — SEALER VESSEL EXTEND

## (undated) DEVICE — SYR 50CC LL

## (undated) DEVICE — LINER SUCTION 3000CC

## (undated) DEVICE — ADHESIVE DERMABOND ADVANCED

## (undated) DEVICE — ELECTRODE MEGADYNE RETURN DUAL

## (undated) DEVICE — SCISSOR 5MMX35CM DIRECT DRIVE

## (undated) DEVICE — NDL PNEUMOPERITONEUM 150MM

## (undated) DEVICE — COVER TABLE 44X90 STERILE

## (undated) DEVICE — SEAL CANN UNIVERSAL 5-12MM